# Patient Record
Sex: FEMALE | Employment: UNEMPLOYED | URBAN - METROPOLITAN AREA
[De-identification: names, ages, dates, MRNs, and addresses within clinical notes are randomized per-mention and may not be internally consistent; named-entity substitution may affect disease eponyms.]

---

## 2017-03-11 ENCOUNTER — HOSPITAL ENCOUNTER (EMERGENCY)
Age: 37
Discharge: HOME | End: 2017-03-11
Attending: OBSTETRICS & GYNECOLOGY | Admitting: OBSTETRICS & GYNECOLOGY
Payer: COMMERCIAL

## 2017-03-11 VITALS
SYSTOLIC BLOOD PRESSURE: 120 MMHG | HEIGHT: 63 IN | DIASTOLIC BLOOD PRESSURE: 72 MMHG | TEMPERATURE: 98.1 F | WEIGHT: 150 LBS | RESPIRATION RATE: 20 BRPM | HEART RATE: 67 BPM | BODY MASS INDEX: 26.58 KG/M2

## 2017-03-11 PROCEDURE — 99282 EMERGENCY DEPT VISIT SF MDM: CPT

## 2017-03-11 PROCEDURE — 59025 FETAL NON-STRESS TEST: CPT

## 2017-03-11 PROCEDURE — 76815 OB US LIMITED FETUS(S): CPT

## 2017-03-11 RX ORDER — CALC/MAG/B COMPLEX/D3/HERB 61
15 TABLET ORAL
COMMUNITY

## 2017-03-11 NOTE — IP AVS SNAPSHOT
[address] 
[phone] Patient: Milka Toth MRN: [id number] :1980 You are allergic to the following Allergen Reactions Ceclor (Cefaclor) Hives Recent Documentation Height Weight BMI OB Status Smoking Status 1.6 m 68 kg 26.57 kg/m2 Pregnant Never Smoker Emergency Contacts Name Discharge Info Relation Home Work Mobile SellnerMarkos  Spouse [3] [phone] About your hospitalization You were admitted on:  N/A You last received care in the:  [facility] You were discharged on:  2017 Unit phone number:  [phone] Why you were hospitalized Your primary diagnosis was:  Not on File Providers Seen During Your Hospitalizations Provider Role Specialty Primary office phone Manuel Jackson MD Attending Provider Obstetrics & Gynecology [phone] Your Primary Care Physician (PCP) Primary Care Physician Office Phone Office Fax NONE ** None ** ** None ** Follow-up Information None Current Discharge Medication List  
  
ASK your doctor about these medications Dose & Instructions Dispensing Information Comments Morning Noon Evening Bedtime FISH -160-1,000 mg Cap Generic drug:  omega 3-dha-epa-fish oil Your next dose is: Today, Tomorrow Other:  _________ Take  by mouth. Refills:  0 PRENATAL DHA+COMPLETE PRENATAL -300 mg-mcg-mg Cmpk Generic drug:  PNV no.24-iron-folic acid-dha Your next dose is: Today, Tomorrow Other:  _________ Take  by mouth. Refills:  0 Discharge Instructions Pregnancy Precautions: Care Instructions Your Care Instructions There is no sure way to prevent labor before your due date ( labor) or to prevent most other pregnancy problems. But there are things you can do to increase your chances of a healthy pregnancy. Go to your appointments, follow your doctor's advice, and take good care of yourself. Eat well, and exercise (if your doctor agrees). And make sure to drink plenty of water. Follow-up care is a key part of your treatment and safety. Be sure to make and go to all appointments, and call your doctor if you are having problems. It's also a good idea to know your test results and keep a list of the medicines you take. How can you care for yourself at home? · Make sure you go to your prenatal appointments. At each visit, your doctor will check your blood pressure. Your doctor will also check to see if you have protein in your urine. High blood pressure and protein in urine are signs of preeclampsia. This condition can be dangerous for you and your baby. · Drink plenty of fluids, enough so that your urine is light yellow or clear like water. Dehydration can cause contractions. If you have kidney, heart, or liver disease and have to limit fluids, talk with your doctor before you increase the amount of fluids you drink. · Tell your doctor right away if you notice any symptoms of an infection, such as: ¨ Burning when you urinate. ¨ A foul-smelling discharge from your vagina. ¨ Vaginal itching. ¨ Unexplained fever. ¨ Unusual pain or soreness in your uterus or lower belly. · Eat a balanced diet. Include plenty of foods that are high in calcium and iron. ¨ Foods high in calcium include milk, cheese, yogurt, almonds, and broccoli. ¨ Foods high in iron include red meat, shellfish, poultry, eggs, beans, raisins, whole-grain bread, and leafy green vegetables. · Do not smoke. If you need help quitting, talk to your doctor about stop-smoking programs and medicines. These can increase your chances of quitting for good. · Do not drink alcohol or use illegal drugs. · Follow your doctor's directions about activity. Your doctor will let you know how much, if any, exercise you can do. · Ask your doctor if you can have sex. If you are at risk for early labor, your doctor may ask you to not have sex. · Take care to prevent falls. During pregnancy, your joints are loose, and your balance is off. Sports such as bicycling, skiing, or in-line skating can increase your risk of falling. And don't ride horses or motorcycles, dive, water ski, scuba dive, or parachute jump while you are pregnant. · Avoid getting very hot. Do not use saunas or hot tubs. Avoid staying out in the sun in hot weather for long periods. Take acetaminophen (Tylenol) to lower a high fever. · Do not take any over-the-counter or herbal medicines or supplements without talking to your doctor or pharmacist first. 
When should you call for help? Call 911 anytime you think you may need emergency care. For example, call if: 
· You passed out (lost consciousness). · You have severe vaginal bleeding. · You have severe pain in your belly or pelvis. · You have had fluid gushing or leaking from your vagina and you know or think the umbilical cord is bulging into your vagina. If this happens, immediately get down on your knees so your rear end (buttocks) is higher than your head. This will decrease the pressure on the cord until help arrives. Call your doctor now or seek immediate medical care if: 
· You have signs of preeclampsia, such as: 
¨ Sudden swelling of your face, hands, or feet. ¨ New vision problems (such as dimness or blurring). ¨ A severe headache. · You have any vaginal bleeding. · You have belly pain or cramping. · You have a fever. · You have had regular contractions (with or without pain) for an hour. This means that you have 8 or more within 1 hour or 4 or more in 20 minutes after you change your position and drink fluids. · You have a sudden release of fluid from your vagina. · You have low back pain or pelvic pressure that does not go away. · You notice that your baby has stopped moving or is moving much less than normal. 
Watch closely for changes in your health, and be sure to contact your doctor if you have any problems. Where can you learn more? Go to http://alanna-sally.info/. Enter [id number] in the search box to learn more about \"Pregnancy Precautions: Care Instructions. \" Current as of: May 30, 2016 Content Version: 11.1 © 3485-2483 [employer]. Care instructions adapted under license by [employer] (which disclaims liability or warranty for this information). If you have questions about a medical condition or this instruction, always ask your healthcare professional. [address] any warranty or liability for your use of this information. Discharge Orders None Introducing [facility]! Dear Isabel Spear: Thank you for requesting a Footnote account. Our records indicate that you have previously registered for a Footnote account but its currently inactive. Please call our Footnote support line at [phone]. Additional Information If you have questions, please visit the Frequently Asked Questions section of the Footnote website at [email or web address] Remember, Footnote is NOT to be used for urgent needs. For medical emergencies, dial 911. Now available from your iPhone and Android! General Information Please provide this summary of care documentation to your next provider. Patient Signature:  ____________________________________________________________ Date:  ____________________________________________________________  
  
Diaz Kraus Provider Signature:  ____________________________________________________________ Date:  ____________________________________________________________

## 2017-03-11 NOTE — PROGRESS NOTES
Pt presents to [facility] with C/O decreased fetal movement. Though she has felt some movements since drinking juice. Dr Nigel Purvis at bedside doing a ultrasound.

## 2017-03-11 NOTE — IP AVS SNAPSHOT
Current Discharge Medication List  
  
ASK your doctor about these medications Dose & Instructions Dispensing Information Comments Morning Noon Evening Bedtime FISH -160-1,000 mg Cap Generic drug:  omega 3-dha-epa-fish oil Your next dose is: Today, Tomorrow Other:  ____________ Take  by mouth. Refills:  0 PRENATAL DHA+COMPLETE PRENATAL -300 mg-mcg-mg Cmpk Generic drug:  PNV no.24-iron-folic acid-dha Your next dose is: Today, Tomorrow Other:  ____________ Take  by mouth. Refills:  0

## 2017-03-11 NOTE — DISCHARGE INSTRUCTIONS
Pregnancy Precautions: Care Instructions  Your Care Instructions  There is no sure way to prevent labor before your due date ( labor) or to prevent most other pregnancy problems. But there are things you can do to increase your chances of a healthy pregnancy. Go to your appointments, follow your doctor's advice, and take good care of yourself. Eat well, and exercise (if your doctor agrees). And make sure to drink plenty of water. Follow-up care is a key part of your treatment and safety. Be sure to make and go to all appointments, and call your doctor if you are having problems. It's also a good idea to know your test results and keep a list of the medicines you take. How can you care for yourself at home? · Make sure you go to your prenatal appointments. At each visit, your doctor will check your blood pressure. Your doctor will also check to see if you have protein in your urine. High blood pressure and protein in urine are signs of preeclampsia. This condition can be dangerous for you and your baby. · Drink plenty of fluids, enough so that your urine is light yellow or clear like water. Dehydration can cause contractions. If you have kidney, heart, or liver disease and have to limit fluids, talk with your doctor before you increase the amount of fluids you drink. · Tell your doctor right away if you notice any symptoms of an infection, such as:  ¨ Burning when you urinate. ¨ A foul-smelling discharge from your vagina. ¨ Vaginal itching. ¨ Unexplained fever. ¨ Unusual pain or soreness in your uterus or lower belly. · Eat a balanced diet. Include plenty of foods that are high in calcium and iron. ¨ Foods high in calcium include milk, cheese, yogurt, almonds, and broccoli. ¨ Foods high in iron include red meat, shellfish, poultry, eggs, beans, raisins, whole-grain bread, and leafy green vegetables. · Do not smoke.  If you need help quitting, talk to your doctor about stop-smoking programs and medicines. These can increase your chances of quitting for good. · Do not drink alcohol or use illegal drugs. · Follow your doctor's directions about activity. Your doctor will let you know how much, if any, exercise you can do. · Ask your doctor if you can have sex. If you are at risk for early labor, your doctor may ask you to not have sex. · Take care to prevent falls. During pregnancy, your joints are loose, and your balance is off. Sports such as bicycling, skiing, or in-line skating can increase your risk of falling. And don't ride horses or motorcycles, dive, water ski, scuba dive, or parachute jump while you are pregnant. · Avoid getting very hot. Do not use saunas or hot tubs. Avoid staying out in the sun in hot weather for long periods. Take acetaminophen (Tylenol) to lower a high fever. · Do not take any over-the-counter or herbal medicines or supplements without talking to your doctor or pharmacist first.  When should you call for help? Call 911 anytime you think you may need emergency care. For example, call if:  · You passed out (lost consciousness). · You have severe vaginal bleeding. · You have severe pain in your belly or pelvis. · You have had fluid gushing or leaking from your vagina and you know or think the umbilical cord is bulging into your vagina. If this happens, immediately get down on your knees so your rear end (buttocks) is higher than your head. This will decrease the pressure on the cord until help arrives. Call your doctor now or seek immediate medical care if:  · You have signs of preeclampsia, such as:  ¨ Sudden swelling of your face, hands, or feet. ¨ New vision problems (such as dimness or blurring). ¨ A severe headache. · You have any vaginal bleeding. · You have belly pain or cramping. · You have a fever. · You have had regular contractions (with or without pain) for an hour.  This means that you have 8 or more within 1 hour or 4 or more in 20 minutes after you change your position and drink fluids. · You have a sudden release of fluid from your vagina. · You have low back pain or pelvic pressure that does not go away. · You notice that your baby has stopped moving or is moving much less than normal.  Watch closely for changes in your health, and be sure to contact your doctor if you have any problems. Where can you learn more? Go to http://alanna-sally.info/. Enter [id number] in the search box to learn more about \"Pregnancy Precautions: Care Instructions. \"  Current as of: May 30, 2016  Content Version: 11.1  © 6756-8668 [employer]. Care instructions adapted under license by [employer] (which disclaims liability or warranty for this information). If you have questions about a medical condition or this instruction, always ask your healthcare professional. [address] any warranty or liability for your use of this information.

## 2017-03-11 NOTE — PROGRESS NOTES
History & Physical    Name: Phong Mayfield MRN: [id number]  SSN: [id number]    YOB: 1980  Age: 29 y.o. Sex: female      Subjective:     Reason for Admission:  Pregnancy and decreased fetal movement    History of Present Illness: Ms. Ranee Shone is a 29 y.o.   female with an estimated gestational age of 35w11d with Estimated Date of Delivery: 17. Patient complains of decreased fetal movement this am for approx 3 hours. Started to feel movement after drinking juice and  pushing on belly. Increased movement now. No bleeding or leaking. Pregnancy c/b 39 week demise last pregnancy, Rh neg, h/o 4th degree laceration with first pregnancy. OB History    Para Term  AB SAB TAB Ectopic Multiple Living   4 3 3 0 0 0 0 0 0 2      # Outcome Date GA Lbr Gregor/2nd Weight Sex Delivery Anes PTL Lv   4 Current            3 Term 2015 39w0d   F Vag-Spont   FD      Birth Comments: cord death of infant   2 Term 13 38w4d  6 lb 7 oz (2.92 kg) M VAGINAL DELI EPI N Y   1 Term 11 38w2d  6 lb 1 oz (2.75 kg) F VAGINAL DELI None N Y        Past Medical History:   Diagnosis Date    HX OTHER MEDICAL     IUFD (intrauterine fetal death) 6/10/2015    Postpartum depression     PP depression after 1st, not treated    Rh negative status during pregnancy 2016    Supervision of high-risk pregnancy 2016    Trauma     left foot broken     Past Surgical History:   Procedure Laterality Date    HX COLONOSCOPY       Social History     Occupational History    Not on file.      Social History Main Topics    Smoking status: Never Smoker    Smokeless tobacco: Never Used    Alcohol use No    Drug use: No    Sexual activity: Yes     Partners: Male     Birth control/ protection: None      Family History   Problem Relation Age of Onset    Asthma Mother     Thyroid Disease Mother    [facility] Arthritis-osteo Mother    [place] Bladder Disease Mother     Depression Mother     Diabetes Father     Hypertension Father     Elevated Lipids Father     Heart Disease Father     Alcohol abuse Father     Heart Attack Brother     Alcohol abuse Brother    Kelsi Halon Syndrome Other      Spouse's Uncle    Thyroid Disease Sister     Depression Sister     Hypertension Maternal Grandmother     Heart Disease Maternal Grandfather     Lung Disease Maternal Grandfather     Lung Disease Paternal Grandmother        Allergies   Allergen Reactions    Ceclor [Cefaclor] Hives     Prior to Admission medications    Medication Sig Start Date End Date Taking? Authorizing Provider   PNV no.24-iron-folic acid-dha (PRENATAL DHA+COMPLETE PRENATAL) [id number] mg-mcg-mg cmpk Take  by mouth. Historical Provider        Review of Systems:  A comprehensive review of systems was negative except for that written in the History of Present Illness. Objective:     Vitals: There were no vitals filed for this visit. No data recorded. No Data Recorded     Physical Exam:  Patient without distress. Abdomen: soft, nontender  Fundus: soft and non tender  Lower Extremities:  - Edema No   - No evidence of DVT seen on physical exam.     Membranes:  Intact  Uterine Activity:  None  Fetal Heart Rate:  Reactive  Baseline: 135 per minute  Variability: moderate  Accelerations: yes  Decelerations: none       Lab/Data Review:  No results found for this or any previous visit (from the past 24 hour(s)). Bedside ultrasound:   [place] fetus in cephalic presentation   bpm  LOLITA: 10.22  BPP 8/8  Fetal biometry:    BPD 7.66 cm c/w 30 5/7   HC 29.61 cm c/w 32 5/7   AC 28.30 cm c/w 32 2/7   FL 6.10 cm c/w 31 5/   AUA 31 6/7 with weight of 4 lb 3 oz    Assessment and Plan: Active Problems:    * No active hospital problems.  *     Decreased FM with h/o fetal demise last pregnancy  - improved movement here  - BPP 10/10 (reactive NST), normal fluid  - Has an appt in the office this Friday with growth but informal growth today 4 lb 3 oz   - Continue kick counts and encouraged to call with any concerns    Signed By:  Queen Seven MD     March 11, 2017

## 2017-03-11 NOTE — IP AVS SNAPSHOT
Summary of Care Report The Summary of Care report has been created to help improve care coordination. Users with access to [place] or [facility] (Web-based application) may access additional patient information including the Discharge Summary. If you are not currently a [address] user and need more information, please call the number listed below in the [place] [address] section and ask to be connected with Medical Records. Facility Information Name Address Phone [id number] [place] [address] [address] [place] [id number] [phone] Patient Information Patient Name Sex DONNY Griffin ([id number]) Female 1980 Discharge Information Admitting Provider Service Area Unit Kai Felton MD / Sawyer Romero 4 Antepartum / [phone] Discharge Provider Discharge Date/Time Discharge Disposition Destination (none) (none) (none) (none) Patient Language Language ENGLISH [13] Problem List as of 3/11/2017  Date Reviewed: 2017 Codes Priority Class Noted - Resolved Family history of Down syndrome ICD-10-CM: Z82.79 ICD-9-CM: V19.5   2014 - Present Family history of hemophilia ICD-10-CM: Z83.2 ICD-9-CM: V18.3   2014 - Present Prior poor obstetrical history (3rd trimester fetal demise) ICD-10-CM: O09.292 ICD-9-CM: V23.49   6/10/2015 - Present Overview Addendum 2016  2:44 PM by Nadine Rodrigues MD  
  Genetically normal; felt to be cord accident due to tight nuchal 
 
Would initiate 2x/week testing by 34 weeks. Would deliver at 39th weeks, sooner if concerns. Elderly multigravida in first trimester ICD-10-CM: O09.521 ICD-9-CM: 659.63   10/6/2016 - Present  Overview Addendum 2016  2:26 PM by Geoff De Leon RN  
 NIPT low risk - level 2 anatomy and echo: WNL Repeat at next visit-orders placed. Supervision of high-risk pregnancy ICD-10-CM: O09.90 ICD-9-CM: V23.9   2016 - Present Rh negative status during pregnancy ICD-10-CM: O09.899 ICD-9-CM: V23.89   2016 - Present Overview Addendum 2016  1:41 PM by Shaggy Castrejon MD  
  Antibody screen positive.  (titer too weak) - repeat 4 weeks Negative ABS on 2016 You are allergic to the following Allergen Reactions Ceclor (Cefaclor) Hives Current Discharge Medication List  
  
ASK your doctor about these medications Dose & Instructions Dispensing Information Comments FISH -160-1,000 mg Cap Generic drug:  omega 3-dha-epa-fish oil Take  by mouth. Refills:  0 PRENATAL DHA+COMPLETE PRENATAL -300 mg-mcg-mg Cmpk Generic drug:  PNV no.24-iron-folic acid-dha Take  by mouth. Refills:  0 Current Immunizations Name Date [address] INJECTION 3/16/2011 Rho(D) Immune Globulin - IM 6/10/2015, 2013 Tdap 2013 Follow-up Information None Discharge Instructions Pregnancy Precautions: Care Instructions Your Care Instructions There is no sure way to prevent labor before your due date ( labor) or to prevent most other pregnancy problems. But there are things you can do to increase your chances of a healthy pregnancy. Go to your appointments, follow your doctor's advice, and take good care of yourself. Eat well, and exercise (if your doctor agrees). And make sure to drink plenty of water. Follow-up care is a key part of your treatment and safety. Be sure to make and go to all appointments, and call your doctor if you are having problems. It's also a good idea to know your test results and keep a list of the medicines you take. How can you care for yourself at home? · Make sure you go to your prenatal appointments. At each visit, your doctor will check your blood pressure. Your doctor will also check to see if you have protein in your urine. High blood pressure and protein in urine are signs of preeclampsia. This condition can be dangerous for you and your baby. · Drink plenty of fluids, enough so that your urine is light yellow or clear like water. Dehydration can cause contractions. If you have kidney, heart, or liver disease and have to limit fluids, talk with your doctor before you increase the amount of fluids you drink. · Tell your doctor right away if you notice any symptoms of an infection, such as: ¨ Burning when you urinate. ¨ A foul-smelling discharge from your vagina. ¨ Vaginal itching. ¨ Unexplained fever. ¨ Unusual pain or soreness in your uterus or lower belly. · Eat a balanced diet. Include plenty of foods that are high in calcium and iron. ¨ Foods high in calcium include milk, cheese, yogurt, almonds, and broccoli. ¨ Foods high in iron include red meat, shellfish, poultry, eggs, beans, raisins, whole-grain bread, and leafy green vegetables. · Do not smoke. If you need help quitting, talk to your doctor about stop-smoking programs and medicines. These can increase your chances of quitting for good. · Do not drink alcohol or use illegal drugs. · Follow your doctor's directions about activity. Your doctor will let you know how much, if any, exercise you can do. · Ask your doctor if you can have sex. If you are at risk for early labor, your doctor may ask you to not have sex. · Take care to prevent falls. During pregnancy, your joints are loose, and your balance is off. Sports such as bicycling, skiing, or in-line skating can increase your risk of falling. And don't ride horses or motorcycles, dive, water ski, scuba dive, or parachute jump while you are pregnant. · Avoid getting very hot. Do not use saunas or hot tubs.  Avoid staying out in the sun in hot weather for long periods. Take acetaminophen (Tylenol) to lower a high fever. · Do not take any over-the-counter or herbal medicines or supplements without talking to your doctor or pharmacist first. 
When should you call for help? Call 911 anytime you think you may need emergency care. For example, call if: 
· You passed out (lost consciousness). · You have severe vaginal bleeding. · You have severe pain in your belly or pelvis. · You have had fluid gushing or leaking from your vagina and you know or think the umbilical cord is bulging into your vagina. If this happens, immediately get down on your knees so your rear end (buttocks) is higher than your head. This will decrease the pressure on the cord until help arrives. Call your doctor now or seek immediate medical care if: 
· You have signs of preeclampsia, such as: 
¨ Sudden swelling of your face, hands, or feet. ¨ New vision problems (such as dimness or blurring). ¨ A severe headache. · You have any vaginal bleeding. · You have belly pain or cramping. · You have a fever. · You have had regular contractions (with or without pain) for an hour. This means that you have 8 or more within 1 hour or 4 or more in 20 minutes after you change your position and drink fluids. · You have a sudden release of fluid from your vagina. · You have low back pain or pelvic pressure that does not go away. · You notice that your baby has stopped moving or is moving much less than normal. 
Watch closely for changes in your health, and be sure to contact your doctor if you have any problems. Where can you learn more? Go to http://alanna-sally.info/. Enter [id number] in the search box to learn more about \"Pregnancy Precautions: Care Instructions. \" Current as of: May 30, 2016 Content Version: 11.1 © 5650-3776 [employer].  Care instructions adapted under license by [address] (which disclaims liability or warranty for this information). If you have questions about a medical condition or this instruction, always ask your healthcare professional. [place] [address] any warranty or liability for your use of this information. Chart Review Routing History No Routing History on File

## 2017-04-12 LAB — GRBS, EXTERNAL: NEGATIVE

## 2017-04-24 ENCOUNTER — ANESTHESIA (OUTPATIENT)
Dept: LABOR AND DELIVERY | Age: 37
End: 2017-04-24
Payer: COMMERCIAL

## 2017-04-24 ENCOUNTER — HOSPITAL ENCOUNTER (INPATIENT)
Age: 37
LOS: 1 days | Discharge: HOME | End: 2017-04-25
Attending: OBSTETRICS & GYNECOLOGY | Admitting: OBSTETRICS & GYNECOLOGY
Payer: COMMERCIAL

## 2017-04-24 ENCOUNTER — ANESTHESIA EVENT (OUTPATIENT)
Dept: LABOR AND DELIVERY | Age: 37
End: 2017-04-24
Payer: COMMERCIAL

## 2017-04-24 PROBLEM — Z37.9 NORMAL LABOR: Status: ACTIVE | Noted: 2017-04-24

## 2017-04-24 PROBLEM — O09.299 PRIOR PREGNANCY WITH FETAL DEMISE AND CURRENT PREGNANCY: Status: ACTIVE | Noted: 2017-04-24

## 2017-04-24 LAB
ABO + RH BLD: NORMAL
BASE DEFICIT BLDCOA-SCNC: 2.5 MMOL/L (ref 0–2)
BASE DEFICIT BLDCOV-SCNC: 2.1 MMOL/L (ref 1.9–7.7)
BDY SITE: ABNORMAL
BDY SITE: NORMAL
BLOOD BANK CMNT PATIENT-IMP: NORMAL
BLOOD GROUP ANTIBODIES SERPL: NORMAL
ERYTHROCYTE [DISTWIDTH] IN BLOOD BY AUTOMATED COUNT: 14 % (ref 11.9–14.6)
FETAL SCREEN,FMHS: NORMAL
HCO3 BLDCOA-SCNC: 24 MMOL/L (ref 22–26)
HCO3 BLDV-SCNC: 22 MMOL/L
HCT VFR BLD AUTO: 37 % (ref 35.8–46.3)
HGB BLD-MCNC: 12.3 G/DL (ref 11.7–15.4)
MCH RBC QN AUTO: 29.1 PG (ref 26.1–32.9)
MCHC RBC AUTO-ENTMCNC: 33.2 G/DL (ref 31.4–35)
MCV RBC AUTO: 87.5 FL (ref 79.6–97.8)
PCO2 BLDCOA: 50 MMHG (ref 33–49)
PCO2 BLDCOV: 37 MMHG (ref 14.1–43.3)
PH BLDCOA: 7.3 [PH] (ref 7.21–7.31)
PH BLDCOV: 7.4 [PH] (ref 7.2–7.44)
PLATELET # BLD AUTO: 141 K/UL (ref 150–450)
PMV BLD AUTO: 10.7 FL (ref 10.8–14.1)
PO2 BLDCOA: 24 MMHG (ref 9–19)
PO2 BLDV: 31 MMHG (ref 30.4–57.2)
RBC # BLD AUTO: 4.23 M/UL (ref 4.05–5.25)
SERVICE CMNT-IMP: ABNORMAL
SERVICE CMNT-IMP: NORMAL
SPECIMEN EXP DATE BLD: NORMAL
WBC # BLD AUTO: 9 K/UL (ref 4.3–11.1)

## 2017-04-24 PROCEDURE — 85027 COMPLETE CBC AUTOMATED: CPT | Performed by: OBSTETRICS & GYNECOLOGY

## 2017-04-24 PROCEDURE — 75410000003 HC RECOV DEL/VAG/CSECN EA 0.5 HR

## 2017-04-24 PROCEDURE — 77030014125 HC TY EPDRL BBMI -B: Performed by: ANESTHESIOLOGY

## 2017-04-24 PROCEDURE — 74011250636 HC RX REV CODE- 250/636

## 2017-04-24 PROCEDURE — 3E033VJ INTRODUCTION OF OTHER HORMONE INTO PERIPHERAL VEIN, PERCUTANEOUS APPROACH: ICD-10-PCS | Performed by: OBSTETRICS & GYNECOLOGY

## 2017-04-24 PROCEDURE — 75410000002 HC LABOR FEE PER 1 HR

## 2017-04-24 PROCEDURE — 74011258636 HC RX REV CODE- 258/636: Performed by: OBSTETRICS & GYNECOLOGY

## 2017-04-24 PROCEDURE — 76060000078 HC EPIDURAL ANESTHESIA

## 2017-04-24 PROCEDURE — 77030005518 HC CATH URETH FOL 2W BARD -B

## 2017-04-24 PROCEDURE — A4300 CATH IMPL VASC ACCESS PORTAL: HCPCS | Performed by: ANESTHESIOLOGY

## 2017-04-24 PROCEDURE — 74011250637 HC RX REV CODE- 250/637: Performed by: OBSTETRICS & GYNECOLOGY

## 2017-04-24 PROCEDURE — 65270000029 HC RM PRIVATE

## 2017-04-24 PROCEDURE — 4A1HXCZ MONITORING OF PRODUCTS OF CONCEPTION, CARDIAC RATE, EXTERNAL APPROACH: ICD-10-PCS | Performed by: OBSTETRICS & GYNECOLOGY

## 2017-04-24 PROCEDURE — 86900 BLOOD TYPING SEROLOGIC ABO: CPT | Performed by: OBSTETRICS & GYNECOLOGY

## 2017-04-24 PROCEDURE — 82803 BLOOD GASES ANY COMBINATION: CPT

## 2017-04-24 PROCEDURE — 74011250636 HC RX REV CODE- 250/636: Performed by: OBSTETRICS & GYNECOLOGY

## 2017-04-24 PROCEDURE — 85461 HEMOGLOBIN FETAL: CPT | Performed by: OBSTETRICS & GYNECOLOGY

## 2017-04-24 PROCEDURE — 77030018846 HC SOL IRR STRL H20 ICUM -A

## 2017-04-24 PROCEDURE — 75410000000 HC DELIVERY VAGINAL/SINGLE

## 2017-04-24 RX ORDER — OXYTOCIN/RINGER'S LACTATE 15/250 ML
250 PLASTIC BAG, INJECTION (ML) INTRAVENOUS ONCE
Status: DISCONTINUED | OUTPATIENT
Start: 2017-04-24 | End: 2017-04-24

## 2017-04-24 RX ORDER — NALOXONE HYDROCHLORIDE 0.4 MG/ML
0.4 INJECTION, SOLUTION INTRAMUSCULAR; INTRAVENOUS; SUBCUTANEOUS AS NEEDED
Status: DISCONTINUED | OUTPATIENT
Start: 2017-04-24 | End: 2017-04-25 | Stop reason: HOSPADM

## 2017-04-24 RX ORDER — BUTORPHANOL TARTRATE 1 MG/ML
1 INJECTION INTRAMUSCULAR; INTRAVENOUS
Status: DISCONTINUED | OUTPATIENT
Start: 2017-04-24 | End: 2017-04-24 | Stop reason: HOSPADM

## 2017-04-24 RX ORDER — HYDROMORPHONE HYDROCHLORIDE 1 MG/ML
1 INJECTION, SOLUTION INTRAMUSCULAR; INTRAVENOUS; SUBCUTANEOUS
Status: DISCONTINUED | OUTPATIENT
Start: 2017-04-24 | End: 2017-04-25 | Stop reason: HOSPADM

## 2017-04-24 RX ORDER — FENTANYL CITRATE 50 UG/ML
INJECTION, SOLUTION INTRAMUSCULAR; INTRAVENOUS
Status: DISCONTINUED
Start: 2017-04-24 | End: 2017-04-24

## 2017-04-24 RX ORDER — FENTANYL CITRATE 50 UG/ML
INJECTION, SOLUTION INTRAMUSCULAR; INTRAVENOUS AS NEEDED
Status: DISCONTINUED | OUTPATIENT
Start: 2017-04-24 | End: 2017-04-24 | Stop reason: HOSPADM

## 2017-04-24 RX ORDER — DOCUSATE SODIUM 100 MG/1
100 CAPSULE, LIQUID FILLED ORAL 2 TIMES DAILY
Status: DISCONTINUED | OUTPATIENT
Start: 2017-04-24 | End: 2017-04-25 | Stop reason: HOSPADM

## 2017-04-24 RX ORDER — ROPIVACAINE HYDROCHLORIDE 2 MG/ML
INJECTION, SOLUTION EPIDURAL; INFILTRATION; PERINEURAL
Status: DISCONTINUED | OUTPATIENT
Start: 2017-04-24 | End: 2017-04-24 | Stop reason: HOSPADM

## 2017-04-24 RX ORDER — LIDOCAINE HYDROCHLORIDE 10 MG/ML
1 INJECTION INFILTRATION; PERINEURAL
Status: DISCONTINUED | OUTPATIENT
Start: 2017-04-24 | End: 2017-04-24 | Stop reason: HOSPADM

## 2017-04-24 RX ORDER — IBUPROFEN 800 MG/1
800 TABLET ORAL
Status: DISCONTINUED | OUTPATIENT
Start: 2017-04-24 | End: 2017-04-25 | Stop reason: HOSPADM

## 2017-04-24 RX ORDER — SODIUM CHLORIDE 0.9 % (FLUSH) 0.9 %
5-10 SYRINGE (ML) INJECTION EVERY 8 HOURS
Status: DISCONTINUED | OUTPATIENT
Start: 2017-04-24 | End: 2017-04-24

## 2017-04-24 RX ORDER — LIDOCAINE HYDROCHLORIDE 20 MG/ML
JELLY TOPICAL
Status: DISCONTINUED | OUTPATIENT
Start: 2017-04-24 | End: 2017-04-24 | Stop reason: HOSPADM

## 2017-04-24 RX ORDER — HYDROCODONE BITARTRATE AND ACETAMINOPHEN 5; 325 MG/1; MG/1
1-2 TABLET ORAL
Status: DISCONTINUED | OUTPATIENT
Start: 2017-04-24 | End: 2017-04-25 | Stop reason: HOSPADM

## 2017-04-24 RX ORDER — SODIUM CHLORIDE 0.9 % (FLUSH) 0.9 %
5-10 SYRINGE (ML) INJECTION AS NEEDED
Status: DISCONTINUED | OUTPATIENT
Start: 2017-04-24 | End: 2017-04-24

## 2017-04-24 RX ORDER — DIPHENHYDRAMINE HCL 25 MG
25 CAPSULE ORAL
Status: DISCONTINUED | OUTPATIENT
Start: 2017-04-24 | End: 2017-04-25 | Stop reason: HOSPADM

## 2017-04-24 RX ORDER — OXYTOCIN/RINGER'S LACTATE 15/250 ML
250 PLASTIC BAG, INJECTION (ML) INTRAVENOUS ONCE
Status: ACTIVE | OUTPATIENT
Start: 2017-04-24 | End: 2017-04-25

## 2017-04-24 RX ORDER — OXYTOCIN/RINGER'S LACTATE 30/500 ML
.5-2 PLASTIC BAG, INJECTION (ML) INTRAVENOUS
Status: DISCONTINUED | OUTPATIENT
Start: 2017-04-24 | End: 2017-04-24 | Stop reason: HOSPADM

## 2017-04-24 RX ORDER — SIMETHICONE 80 MG
80 TABLET,CHEWABLE ORAL
Status: DISCONTINUED | OUTPATIENT
Start: 2017-04-24 | End: 2017-04-25 | Stop reason: HOSPADM

## 2017-04-24 RX ORDER — DEXTROSE, SODIUM CHLORIDE, SODIUM LACTATE, POTASSIUM CHLORIDE, AND CALCIUM CHLORIDE 5; .6; .31; .03; .02 G/100ML; G/100ML; G/100ML; G/100ML; G/100ML
125 INJECTION, SOLUTION INTRAVENOUS CONTINUOUS
Status: DISCONTINUED | OUTPATIENT
Start: 2017-04-24 | End: 2017-04-24

## 2017-04-24 RX ORDER — ONDANSETRON 4 MG/1
4 TABLET, ORALLY DISINTEGRATING ORAL
Status: DISCONTINUED | OUTPATIENT
Start: 2017-04-24 | End: 2017-04-25 | Stop reason: HOSPADM

## 2017-04-24 RX ORDER — MINERAL OIL
120 OIL (ML) ORAL
Status: DISCONTINUED | OUTPATIENT
Start: 2017-04-24 | End: 2017-04-24 | Stop reason: HOSPADM

## 2017-04-24 RX ADMIN — IBUPROFEN 800 MG: 800 TABLET, FILM COATED ORAL at 19:09

## 2017-04-24 RX ADMIN — SODIUM CHLORIDE, SODIUM LACTATE, POTASSIUM CHLORIDE, CALCIUM CHLORIDE, AND DEXTROSE MONOHYDRATE 125 ML/HR: 600; 310; 30; 20; 5 INJECTION, SOLUTION INTRAVENOUS at 08:00

## 2017-04-24 RX ADMIN — ROPIVACAINE HYDROCHLORIDE 8 ML/HR: 2 INJECTION, SOLUTION EPIDURAL; INFILTRATION; PERINEURAL at 11:31

## 2017-04-24 RX ADMIN — DOCUSATE SODIUM 100 MG: 100 CAPSULE, LIQUID FILLED ORAL at 19:09

## 2017-04-24 RX ADMIN — FENTANYL CITRATE 100 MCG: 50 INJECTION, SOLUTION INTRAMUSCULAR; INTRAVENOUS at 11:27

## 2017-04-24 RX ADMIN — OXYTOCIN 6 MILLI-UNITS/MIN: 10 INJECTION, SOLUTION INTRAMUSCULAR; INTRAVENOUS at 09:15

## 2017-04-24 NOTE — ANESTHESIA PREPROCEDURE EVALUATION
Anesthetic History   No history of anesthetic complications            Review of Systems / Medical History  Patient summary reviewed and pertinent labs reviewed    Pulmonary  Within defined limits                 Neuro/Psych   Within defined limits           Cardiovascular                  Exercise tolerance: >4 METS     GI/Hepatic/Renal     GERD (pregnancy only): well controlled           Endo/Other  Within defined limits           Other Findings              Physical Exam    Airway  Mallampati: II  TM Distance: 4 - 6 cm  Neck ROM: normal range of motion   Mouth opening: Normal     Cardiovascular    Rhythm: regular  Rate: normal         Dental         Pulmonary  Breath sounds clear to auscultation               Abdominal         Other Findings            Anesthetic Plan    ASA: 1  Anesthesia type: epidural            Anesthetic plan and risks discussed with: Patient and Spouse

## 2017-04-24 NOTE — PROGRESS NOTES
Labor Note    S: Feeling more uncomfortable with contractions. Thinks she will be ready for EMILY in 30 min or so. O:   Visit Vitals    /64    Pulse 78    Temp 98.5 °F (36.9 °C)    Ht 5' 3\" (1.6 m)    Wt 159 lb (72.1 kg)    LMP 08/01/2016 (Exact Date)    Breastfeeding Yes    BMI 28.17 kg/m2     Gen- NAD  SVE- deferred    FHT - baseline 130, mod variability , + accels , no decels   [place]-not tracing well, pt moving at bedside    A/P: 29 y.o. [id number] @ 38 0/7  1) Term IUP- Cat 1  2) Labor- on pitocin, s/p AROM. She is considering EMILY soon.    3) GBS neg

## 2017-04-24 NOTE — ROUTINE PROCESS
SBAR IN Report: Mother    Verbal report received from Juaquin Núñez RN on this patient, who is now being transferred from [facility]note 3]) for routine progression of care. The patient is not wearing a green \"Anesthesia-Duramorph\" band. Report consisted of patient's Situation, Background, Assessment and Recommendations (SBAR).  ID bands were compared with the identification form, and verified with the patient and transferring nurse. Information from the SBAR, Kardex, Procedure Summary, Intake/Output, MAR and Accordion and the Cindi Report was reviewed with the transferring nurse; opportunity for questions and clarification provided.

## 2017-04-24 NOTE — PROGRESS NOTES
Gus Worrell at bedside at 1110. CORRIE Reynolds bedside at 2988    Assisted pt to sitting up on bedside at 1112. Timeout completed at 200 with MD, CORRIE and myself at bedside. Test dose given at 1123. Negative reaction. Dose given at 1127. Pt assisted to lying back in left tilt position. See anesthesia record for details. See vital sign flow sheet for BP. Tolerated procedure well.

## 2017-04-24 NOTE — PROGRESS NOTES
Pt admitted to [facility], [address]. Admission assessment completed. Discussed plan of care with patient. Discussed pt's choice of infant feeding method. Feeding options explored, including the importance of exclusive breastfeeding. Pt informed of our breastfeeding support system from from nursing staff and lactation staff during inpatient stay and following discharge. Questions and concerns addressed at this time. Pt confirms breastfeeding is her decision at this time.

## 2017-04-24 NOTE — PROGRESS NOTES
SBAR OUT Report: Mother    Verbal report given to LISA Vuong RN (full name & credentials) on this patient, who is now being transferred to [facility] (unit) for routine progression of care. The patient is not wearing a green \"Anesthesia-Duramorph\" band. Report consisted of patient's Situation, Background, Assessment and Recommendations (SBAR). Terry ID bands were compared with the identification form, and verified with the patient and receiving nurse. Information from the SBAR, Kardex, Procedure Summary, Intake/Output, MAR, Accordion and Recent Results and the [place] Report was reviewed with the receiving nurse; opportunity for questions and clarification provided.

## 2017-04-24 NOTE — IP AVS SNAPSHOT
Juan Hdz 
 
 
 [address] 
[phone] Patient: Karli Jackson MRN: [id number] :1980 You are allergic to the following Allergen Reactions Ceclor (Cefaclor) Hives Immunizations Administered for This Admission Name Date Influenza Vaccine (Quad) PF  Deferred () Tdap  Deferred () Recent Documentation Height Weight Breastfeeding? BMI OB Status Smoking Status 1.6 m 72.1 kg Yes 28.17 kg/m2 Recent pregnancy Never Smoker Emergency Contacts Name Discharge Info Relation Home Work Mobile Markos Huang  Spouse [3] [phone] About your hospitalization You were admitted on:  2017 You last received care in the:  [address] [facility] You were discharged on:  2017 Unit phone number:  [phone] Why you were hospitalized Your primary diagnosis was:  Not on File Your diagnoses also included:  Normal Labor, Prior Pregnancy With Fetal Demise And Current Pregnancy Providers Seen During Your Hospitalizations Provider Role Specialty Primary office phone Fabienne Ponce MD Attending Provider Obstetrics & Gynecology [phone] Abhinav Hampton MD Attending Provider Obstetrics & Gynecology [phone] Your Primary Care Physician (PCP) Primary Care Physician Office Phone Office Fax NONE ** None ** ** None ** Follow-up Information Follow up With Details Comments Contact Info None   None (395) Patient stated that they have no PCP Roman Luna, DO   [address] [facility] [address] [facility] [address] [phone] Abhinav Hampton MD In 6 weeks Call and schedule an appointment to be seen for a 6 week follow up appointment at [facility] [address] [facility] [address] [phone] Current Discharge Medication List  
  
 START taking these medications Dose & Instructions Dispensing Information Comments Morning Noon Evening Bedtime  
 ibuprofen 800 mg tablet Commonly known as:  MOTRIN Your last dose was: Your next dose is:    
   
   
 Dose:  800 mg Take 1 Tab by mouth every eight (8) hours as needed. Quantity:  30 Tab Refills:  0 CONTINUE these medications which have NOT CHANGED Dose & Instructions Dispensing Information Comments Morning Noon Evening Bedtime FISH -160-1,000 mg Cap Generic drug:  omega 3-dha-epa-fish oil Your last dose was: Your next dose is: Take  by mouth. Refills:  0 PRENATAL DHA+COMPLETE PRENATAL -300 mg-mcg-mg Cmpk Generic drug:  PNV no.24-iron-folic acid-dha Your last dose was: Your next dose is: Take  by mouth. Refills:  0 PREVACID 15 mg capsule Generic drug:  lansoprazole Your last dose was: Your next dose is: Take  by mouth Daily (before breakfast). Refills:  0 Where to Get Your Medications Information on where to get these meds will be given to you by the nurse or doctor. ! Ask your nurse or doctor about these medications  
  ibuprofen 800 mg tablet Discharge Instructions Vaginal Childbirth: Care Instructions Your Care Instructions Your body will slowly heal in the next few weeks. It is easy to get too tired and overwhelmed during the first weeks after your baby is born. Changes in your hormones can shift your mood without warning. You may find it hard to meet the extra demands on your energy and time. Take it easy on yourself. Follow-up care is a key part of your treatment and safety.  Be sure to make and go to all appointments, and call your doctor if you are having problems. It's also a good idea to know your test results and keep a list of the medicines you take. How can you care for yourself at home? · Vaginal bleeding and cramps ¨ After delivery, you will have a bloody discharge from the vagina. This will turn pink within a week and then white or yellow after about 10 days. It may last for 2 to 4 weeks or longer, until the uterus has healed. Use pads instead of tampons until you stop bleeding. ¨ Do not worry if you pass some blood clots, as long as they are smaller than a golf ball. If you have a tear or stitches in your vaginal area, change the pad at least every 4 hours to prevent soreness and infection. ¨ You may have cramps for the first few days after childbirth. These are normal and occur as the uterus shrinks to normal size. Take an over-the-counter pain medicine, such as acetaminophen (Tylenol), ibuprofen (Advil, Motrin), or naproxen (Aleve), for cramps. Read and follow all instructions on the label. Do not take aspirin, because it can cause more bleeding. ¨ Do not take two or more pain medicines at the same time unless the doctor told you to. Many pain medicines have acetaminophen, which is Tylenol. Too much acetaminophen (Tylenol) can be harmful. · Stitches ¨ If you have stitches, they will dissolve on their own and do not need to be removed. Follow your doctor's instructions for cleaning the stitched area. ¨ Put ice or a cold pack on your painful area for 10 to 20 minutes at a time, several times a day, for the first few days. Put a thin cloth between the ice and your skin. ¨ Sit in a few inches of warm water (sitz bath) 3 times a day and after bowel movements. The warm water helps with pain and itching. If you do not have a tub, a warm shower might help. · Breast fullness ¨ Your breasts may overfill (engorge) in the first few days after delivery.  To help milk flow and to relieve pain, warm your breasts in the shower or by using warm, moist towels before nursing. ¨ If you are not nursing, do not put warmth on your breasts or touch your breasts. Wear a tight bra or sports bra and use ice until the fullness goes away. This usually takes 2 to 3 days. ¨ Put ice or a cold pack on your breast after nursing to reduce swelling and pain. Put a thin cloth between the ice and your skin. · Activity ¨ Eat a balanced diet. Do not try to lose weight by cutting calories. Keep taking your prenatal vitamins, or take a multivitamin. ¨ Get as much rest as you can. Try to take naps when your baby sleeps during the day. ¨ Get some exercise every day. But do not do any heavy exercise until your doctor says it is okay. ¨ Wait until you are healed (about 4 to 6 weeks) before you have sexual intercourse. Your doctor will tell you when it is okay to have sex. ¨ Talk to your doctor about birth control. You can get pregnant even before your period returns. Also, you can get pregnant while you are breastfeeding. · Mental health ¨ It is normal to have some sadness, anxiety, sleeplessness, and mood swings after you go home. If you feel upset or hopeless for more than a few days or are having trouble doing the things you need to do, talk to your doctor. · Constipation and hemorrhoids ¨ Drink plenty of fluids, enough so that your urine is light yellow or clear like water. If you have kidney, heart, or liver disease and have to limit fluids, talk with your doctor before you increase the amount of fluids you drink. ¨ Eat plenty of fiber each day. Have a bran muffin or bran cereal for breakfast, and try eating a piece of fruit for a mid-afternoon snack. ¨ For painful, itchy hemorrhoids, put ice or a cold pack on the area several times a day for 10 minutes at a time. Follow this by putting a warm compress on the area for another 10 to 20 minutes or by sitting in a shallow, warm bath. When should you call for help? Call 911 anytime you think you may need emergency care. For example, call if: 
· You are thinking of hurting yourself, your baby, or anyone else. · You have sudden, severe pain in your belly. · You passed out (lost consciousness). Call your doctor now or seek immediate medical care if: 
· You have severe vaginal bleeding. · You are soaking through a pad each hour for 2 or more hours. · Your vaginal bleeding seems to be getting heavier or is still bright red 4 days after delivery. · You are dizzy or lightheaded, or you feel like you may faint. · You are vomiting or cannot keep fluids down. · You have a fever. · You have new or more belly pain. · You pass tissue (not just blood). · Your vaginal discharge smells bad. · Your belly feels tender or full and hard. · Your breasts are continuously painful or red. · You feel sad, anxious, or hopeless for more than a few days. Watch closely for changes in your health, and be sure to contact your doctor if you have any problems. Where can you learn more? Go to http://alanna-sally.info/. Enter U053 in the search box to learn more about \"Vaginal Childbirth: Care Instructions. \" Current as of: May 30, 2016 Content Version: 11.2 © 7165-1676 [employer]. Care instructions adapted under license by [employer] (which disclaims liability or warranty for this information). If you have questions about a medical condition or this instruction, always ask your healthcare professional. [place] [address] any warranty or liability for your use of this information. Discharge Orders None [employer] Announcement We are excited to announce that we are making your provider's discharge notes available to you in [employer]. You will see these notes when they are completed and signed by the physician that discharged you from your recent hospital stay.   If you have any questions or concerns about any information you see in [employer], please call the Health Information Department where you were seen or reach out to your Primary Care Provider for more information about your plan of care. Introducing hospitals & HEALTH SERVICES! Dear Ingrid Gonzalez: Thank you for requesting a Veoh account. Our records indicate that you have previously registered for a Veoh account but its currently inactive. Please call our Veoh support line at [phone]. Additional Information If you have questions, please visit the Frequently Asked Questions section of the Veoh website at [email or web address] Remember, Veoh is NOT to be used for urgent needs. For medical emergencies, dial 911. Now available from your iPhone and Android! General Information Please provide this summary of care documentation to your next provider. Patient Signature:  ____________________________________________________________ Date:  ____________________________________________________________  
  
Aleida Mora Provider Signature:  ____________________________________________________________ Date:  ____________________________________________________________

## 2017-04-24 NOTE — H&P
History & Physical    Name: Kulwinder Pak MRN: [id number]  SSN: [id number]    YOB: 1980  Age: 29 y.o. Sex: female      Subjective:     Estimated Date of Delivery: 17  OB History    Para Term  AB SAB TAB Ectopic Multiple Living   4 3 3 0 0 0 0 0 0 2      # Outcome Date GA Lbr Gregor/2nd Weight Sex Delivery Anes PTL Lv   4 Current            3 Term 2015 39w0d   F Vag-Spont   FD      Birth Comments: cord death of infant   2 Term 13 38w4d  6 lb 7 oz (2.92 kg) M VAGINAL DELI EPI N Y   1 Term 11 38w2d  6 lb 1 oz (2.75 kg) F VAGINAL DELI None N Y          Ms. Veronique Vigil is admitted with pregnancy at 38w0d for induction of labor due to h/o term IUFD. Prenatal course was normal.  Please see prenatal records for details. Past Medical History:   Diagnosis Date    HX OTHER MEDICAL     IUFD (intrauterine fetal death) 6/10/2015    Postpartum depression     PP depression after 1st, not treated    Rh negative status during pregnancy 2016    Supervision of high-risk pregnancy 2016    Trauma     left foot broken     Past Surgical History:   Procedure Laterality Date    HX COLONOSCOPY       Social History     Occupational History    Not on file.      Social History Main Topics    Smoking status: Never Smoker    Smokeless tobacco: Never Used    Alcohol use No    Drug use: No    Sexual activity: Yes     Partners: Male     Birth control/ protection: None     Family History   Problem Relation Age of Onset    Asthma Mother     Thyroid Disease Mother    Clydia Punt Arthritis-osteo Mother    Veronda Grade Bladder Disease Mother     Depression Mother     Diabetes Father     Hypertension Father     Elevated Lipids Father     Heart Disease Father     Alcohol abuse Father     Heart Attack Brother     Alcohol abuse Brother    Mica Beavers Syndrome Other      Spouse's Uncle    Thyroid Disease Sister     Depression Sister     Hypertension Maternal Grandmother     Heart Disease Maternal Grandfather     Lung Disease Maternal Grandfather     Lung Disease Paternal Grandmother        Allergies   Allergen Reactions    Ceclor [Cefaclor] Hives     Prior to Admission medications    Medication Sig Start Date End Date Taking? Authorizing Provider   omega 3-dha-epa-fish oil (FISH OIL) 100-160-1,000 mg cap Take  by mouth. Historical Provider   lansoprazole (PREVACID) 15 mg capsule Take  by mouth Daily (before breakfast). Historical Provider   PNV no.24-iron-folic acid-dha (PRENATAL DHA+COMPLETE PRENATAL) -300 mg-mcg-mg cmpk Take  by mouth. Historical Provider        Review of Systems: A comprehensive review of systems was negative except for that written in the History of Present Illness. Objective:     Vitals:  Vitals:    04/24/17 0727 04/24/17 0731 04/24/17 0736 04/24/17 0813   BP:   130/88 121/79   Pulse:   90 75   Temp:  98.7 °F (37.1 °C)     Weight: 159 lb (72.1 kg)      Height: 5' 3\" (1.6 m)           Physical Exam:  Patient without distress. Heart: Regular rate and rhythm, S1S2 present or without murmur or extra heart sounds  Lung: clear to auscultation throughout lung fields, no wheezes, no rales, no rhonchi and normal respiratory effort  Abdomen: soft, nontender  Fundus: soft and non tender  Perineum: blood absent, amniotic fluid absent  Cervical Exam: 2/60/-2  Lower Extremities:  - Edema No   - No evidence of DVT seen on physical exam.  Membranes:  Artificial Rupture of Membranes;  Amniotic Fluid: medium amount of clear fluid  Fetal Heart Rate: Reactive  Baseline: 130 per minute  Variability: moderate  Accelerations: yes  Decelerations: none  Uterine contractions: irregular         Prenatal Labs:   Lab Results   Component Value Date/Time    ABO/Rh(D) B NEGATIVE 06/09/2015 05:32 PM    Rubella, External 1.06-immune 10/06/2016    HBsAg, External neg 10/06/2016    HIV, External NR 10/06/2016    RPR, External NR 10/06/2016    Gonorrhea, External Neg 07/28/2010    Chlamydia, External Neg 07/28/2010    ABO,Rh B NEG 10/02/2014    GrBStrep, External negative 04/12/2017       Impression/Plan:     Active Problems:    Normal labor (4/24/2017)      Prior pregnancy with fetal demise and current pregnancy (4/24/2017)         Plan: Admit for induction of labor. Group B Strep negative. On pitocin, s/p AROM. Considering NCB vs EMILY.      Signed By:  Hector Okeefe MD     April 24, 2017

## 2017-04-24 NOTE — PROGRESS NOTES
Report received from [facility], RN. Patient care assumed-bedside reporting completed.

## 2017-04-24 NOTE — ANESTHESIA PROCEDURE NOTES
Epidural Block    Start time: 4/24/2017 11:17 AM  End time: 4/24/2017 11:22 AM  Performed by: Carol Schafer  Authorized by: Carol Schafer     Pre-Procedure  Indication: at surgeon's request, procedure for pain and labor epidural    Preanesthetic Checklist: patient identified, risks and benefits discussed, anesthesia consent, site marked, patient being monitored, timeout performed and anesthesia consent    Timeout Time: 11:15        Epidural:   Patient position:  Seated  Prep region:  Lumbar  Prep: Chlorhexidine    Location:  L2-3    Needle and Epidural Catheter:   Needle Type:  Tuohy  Needle Gauge:  19 G  Injection Technique:  Loss of resistance using air and loss of resistance using saline  Attempts:  1  Catheter Size:  20 G  Catheter at Skin Depth (cm):  9  Depth in Epidural Space (cm):  5  Events: no blood with aspiration, no cerebrospinal fluid with aspiration, no paresthesia and negative aspiration test    Test Dose:  Lidocaine 1.5% w/ epi and negative    Assessment:   Catheter Secured:  Tegaderm and tape  Insertion:  Uncomplicated  Patient tolerance:  Patient tolerated the procedure well with no immediate complications

## 2017-04-25 VITALS
OXYGEN SATURATION: 99 % | DIASTOLIC BLOOD PRESSURE: 70 MMHG | HEART RATE: 72 BPM | HEIGHT: 63 IN | SYSTOLIC BLOOD PRESSURE: 114 MMHG | TEMPERATURE: 98.1 F | BODY MASS INDEX: 28.17 KG/M2 | WEIGHT: 159 LBS | RESPIRATION RATE: 18 BRPM

## 2017-04-25 PROCEDURE — 74011250637 HC RX REV CODE- 250/637: Performed by: OBSTETRICS & GYNECOLOGY

## 2017-04-25 PROCEDURE — 74011250636 HC RX REV CODE- 250/636: Performed by: OBSTETRICS & GYNECOLOGY

## 2017-04-25 RX ORDER — HYDROCODONE BITARTRATE AND ACETAMINOPHEN 5; 325 MG/1; MG/1
1-2 TABLET ORAL
Qty: 20 TAB | Refills: 0 | Status: SHIPPED | OUTPATIENT
Start: 2017-04-25 | End: 2017-04-25

## 2017-04-25 RX ORDER — IBUPROFEN 800 MG/1
800 TABLET ORAL
Qty: 30 TAB | Refills: 0 | Status: SHIPPED | OUTPATIENT
Start: 2017-04-25 | End: 2018-09-07

## 2017-04-25 RX ADMIN — IBUPROFEN 800 MG: 800 TABLET, FILM COATED ORAL at 10:47

## 2017-04-25 RX ADMIN — DOCUSATE SODIUM 100 MG: 100 CAPSULE, LIQUID FILLED ORAL at 10:47

## 2017-04-25 RX ADMIN — RHO(D) IMMUNE GLOBULIN (HUMAN) 0.3 MG: 1500 SOLUTION INTRAMUSCULAR at 12:28

## 2017-04-25 RX ADMIN — IBUPROFEN 800 MG: 800 TABLET, FILM COATED ORAL at 02:23

## 2017-04-25 NOTE — ANESTHESIA POSTPROCEDURE EVALUATION
Post-Anesthesia Evaluation and Assessment    Patient: Sheldon Davis MRN: [id number]  SSN: [id number]    YOB: 1980  Age: 29 y.o. Sex: female       Cardiovascular Function/Vital Signs  Visit Vitals    /69 (BP 1 Location: Right arm, BP Patient Position: At rest)    Pulse 86    Temp 36.6 °C (97.9 °F)    Resp 17    Ht 5' 3\" (1.6 m)    Wt 72.1 kg (159 lb)    SpO2 99%    Breastfeeding Yes    BMI 28.17 kg/m2       Patient is status post epidural anesthesia for * No procedures listed *. Nausea/Vomiting: None    Postoperative hydration reviewed and adequate. Pain:  Pain Scale 1: Numeric (0 - 10) (04/25/17 0317)  Pain Intensity 1: 1 (04/25/17 0317)   Managed    Neurological Status: At baseline    Mental Status and Level of Consciousness: Arousable    Pulmonary Status:   O2 Device: Room air (04/24/17 1910)   Adequate oxygenation and airway patent    Complications related to anesthesia: None    Post-anesthesia assessment completed.  No concerns    Signed By: Halima Rabago MD     April 25, 2017

## 2017-04-25 NOTE — DISCHARGE SUMMARY
.  Obstetrical Discharge Summary     Name: Liliana Longo MRN: [id number]  SSN: [id number]    YOB: 1980  Age: 29 y.o. Sex: female      Admit Date: 2017    Discharge Date: 2017     Admitting Physician: Almon Denver, MD     Attending Physician:  Almon Denver, MD     * Admission Diagnoses: Normal labor  Prior pregnancy with fetal demise and current pregnancy, third trimester    * Discharge Diagnoses:   Information for the patient's :  Rodolfo Davila [[id number]]   Delivery of a 6 lb 3.1 oz (2.81 kg) female infant via Vaginal, Spontaneous Delivery on 2017 at 2:32 PM  by . Apgars were 9 and 9. Additional Diagnoses:   Hospital Problems as of 2017  Date Reviewed: 2017          Codes Class Noted - Resolved POA    Normal labor ICD-10-CM: O80, Z37.9  ICD-9-CM: 034  2017 - Present Unknown        Prior pregnancy with fetal demise and current pregnancy ICD-10-CM: O09.299  ICD-9-CM: V23.5  2017 - Present Unknown             Lab Results   Component Value Date/Time    ABO/Rh(D) B NEGATIVE 2017 08:20 AM    Rubella, External 1.06-immune 10/06/2016    GrBStrep, External negative 2017    ABO,Rh B NEG 10/02/2014      Immunization History   Administered Date(s) Administered    Rho(D) Immune Globulin - IM 2013, 06/10/2015    Rhogam Injection 2011    Tdap 2013       * Procedures:   * No surgery found *           * Discharge Condition: good    * Hospital Course: Normal hospital course following the delivery. On PPD #1, patient was meeting all milestones including tolerating a regular diet without nausea or vomiting, ambulating and voiding well. Her pain was well controlled with PO meds and she was having normal lochia. Breastfeeding going well. She was stable for discharge. Desires early discharge. Blood pressure 114/70, pulse 72, temperature 98.1 °F (36.7 °C), resp.  rate 18, height 5' 3\" (1.6 m), weight 159 lb (72.1 kg), last menstrual period 08/01/2016, SpO2 99 %, currently breastfeeding. Gen- NAD  Abd- soft, FF@ U-2, NT  Ext- NT, no cords, no edema   [place]- scant lochia        * Disposition: Home    Discharge Medications:   Current Discharge Medication List      START taking these medications    Details   ibuprofen (MOTRIN) 800 mg tablet Take 1 Tab by mouth every eight (8) hours as needed. Qty: 30 Tab, Refills: 0         CONTINUE these medications which have NOT CHANGED    Details   omega 3-dha-epa-fish oil (FISH OIL) 100-160-1,000 mg cap Take  by mouth.      lansoprazole (PREVACID) 15 mg capsule Take  by mouth Daily (before breakfast). PNV no.24-iron-folic acid-dha (PRENATAL DHA+COMPLETE PRENATAL) -300 mg-mcg-mg cmpk Take  by mouth. * Follow-up Care/Patient Instructions:   Activity: Activity as tolerated, No driving for 1-2 weeks, No sex for 6 weeks and No driving while on analgesics  Diet: Regular Diet  Wound Care: Keep wound clean and dry    Follow-up Information     Follow up With Details Comments Contact Info    None   None ([phone]) Patient stated that they have no PCP      23 Lidia Johnson Said, DO   [address] [address] [address] [facility]  [phone]             Signed By:  Ana Bello MD     April 25, 2017

## 2017-04-25 NOTE — PROGRESS NOTES
SW assessment due to history of postpartum depression after patient's first child. Patient reports that she experienced significant amounts of anxiety/irritability for approximately 10 months. Per patient, once she labeled these emotions as postpartum anxiety, she was able to initiate self-care practices and ask her  for assistance. Patient reports that she was anxious during this pregnancy due to fetal demise during her last pregnancy. Patient reports a strong support system of friends/family.  provided education and literature on support available thru [employer] ([employer]). PSI Warmline:  7-359-394-4PPD (3048). WWW. POSTPARTUM. NET    Family was informed of signs/symptoms, forms of intervention (medication, counseling, education), and resources (local coordinators available telephonically, monthly support group in [place], weekly \"chat with expert\" phone sessions). Additionally, patient was provided with [facility] Checklist.\"      Discussed importance of self-care and accepting help when offered. Family was encouraged to contact me with any questions/needs -  contact information provided.       Aniya Diaz, [address] [facility]

## 2017-04-26 NOTE — LACTATION NOTE
Early discharge. Mom and baby are going home today. Continue to offer the breast without restriction. Mom's milk should be fully in over the next few days. Reviewed engorgement precautions. Hand Expression has been demoed and written hand-out reviewed. As milk comes in baby will be more alert at the breast and swallows will be more obvious. Breasts may feel softer once baby has finished nursing. Baby should be back to birth weight by 3weeks of age. And then gain on average 1 oz per day for the next 2-3 months. Reviewed babies should be exclusively breastfeeding for the first 6 months and that breastfeeding should continue after introduction of appropriate complimentary foods after 6 months. Initial output should be at least 1 wet and 1 bowel movement for each day old baby is. By day 5-7 once milk is fully in baby will consistently have 6 or more soaking wet diapers and about 4 bowel movement. Some babies have a bowel movement with every feeding and some have 1-3 large bowel movements each day. Inadequate output may indicate inadequate feedings and should be reported to your Pediatrician. Bowel habits may change as baby gets older. Encouraged follow-up at Pediatrician in 1-2 days, no later than 1 week of age. Call [facility] for any questions as needed or to set up an OP visit. OP phone calls are returned within 24 hours. Breastfeeding Support Group is offered here monthly. Community Breastfeeding Resource List given.

## 2017-04-26 NOTE — LACTATION NOTE
3rd time mom, experienced breastfeeder. Requesting early discharge at 24 hours. Mom reports baby has been latching and doing well at the breast, has been sleepy the past few attempts. Mom eating lunch now but baby starting to get fussy, mom plans to feed again soon. Reviewed feeding expectations for first 24 hours and discussed cluster feeding and 2nd night. Milk will likely come in quickly. Feed on demand and watch output. Offered to assist or observe latch prior to early discharge if needed or desired. Mom confident. Doing well per mom.

## 2017-05-25 NOTE — L&D DELIVERY NOTE
Delivery Note    Obstetrician:  Lanie Thacker MD    Assistant: none    Pre-Delivery Diagnosis: Term pregnancy, Induced labor, Single fetus or Pregnancy complicated by: previous fetal demise at 44 weeks    Post-Delivery Diagnosis: Living  infant(s) or Female    Information for the patient's :  Jorge Hurt Girl Erinn Keene [[id number]]       Labor Events:    Labor: No   Rupture Date: 2017   Rupture Time: 9:04 AM   Rupture Type AROM   Amniotic Fluid Volume: Moderate    Amniotic Fluid Description: Clear     Induction: AROM; Oxytocin       Augmentation: None   Labor Events: None     Cervical Ripening:     None     Delivery Events:  Episiotomy: None   Laceration(s): None     Repaired: None    Number of Repair Packets:     Suture Type and Size: None     Estimated Blood Loss (ml): 150ml       Delivery Date: 2017    Delivery Time: 2:32 PM  Delivery Type: Vaginal, Spontaneous Delivery  Sex:  Female     Gestational Age: 38w0d   Delivery Clinician:  Lanie Thacker  Living Status: Yes   Delivery Location: L&D            APGARS  One minute Five minutes Ten minutes   Skin color: 1   1        Heart rate: 2   2        Grimace: 2   2        Muscle tone: 2   2        Breathin   2        Totals: 9   9            Presentation: Vertex    Position: Right Occiput Anterior  Resuscitation Method:  Tactile Stimulation;Suctioning-bulb     Meconium Stained: None      Cord Vessels: 3 Vessels      Cord Events:    Cord Blood Sent?:  Yes    Blood Gases Sent?:  Yes    Placenta:  Date/Time:   2:36 PM  Removal: Spontaneous      Appearance: Normal      Measurements:  Birth Weight: 6 lb 3.1 oz (2.81 kg)      Birth Length: 49.5 cm      Head Circumference: 33 cm      Chest Circumference: 30.5 cm     Abdominal Girth:       Other Providers:   DARLING Cohen;HAIR MATA;DONALD GALLOWAY, Obstetrician;Primary Nurse;Scrub Tech;Charge Nurse           Group B Strep:   Lab Results   Component Value Date/Time    GrBStrep, External negative 2017     Information for the patient's :  Genella Figures [[id number]]     Lab Results   Component Value Date/Time    ABO/Rh(D) B POSITIVE 2017 02:32 PM    VIKY IgG NEG 2017 02:32 PM     Lab Results   Component Value Date/Time    APH 7.304 2017 02:32 PM    APCO2 50 (H) 2017 02:32 PM    APO2 24 (H) 2017 02:32 PM    AHCO3 24 2017 02:32 PM    ABDC 2.5 (H) 2017 02:32 PM    EPHV 7.399 2017 02:32 PM    PCO2V 37 2017 02:32 PM    PO2V 31 2017 02:32 PM    HCO3V 22 2017 02:32 PM    EBDV 2.1 2017 02:32 PM    SITE CORD 2017 02:32 PM    SITE CORD 2017 02:32 PM    RSCOM NA at 2017 2 48 04 PM. Not read back. 2017 02:32 PM    RSCOM NA at 2017 2 48 30 PM. Not read back. 2017 02:32 PM               Cord Blood Results:   Information for the patient's :  [place] Figures [[id number]]     Lab Results   Component Value Date/Time    VIKY IgG NEG 2017 02:32 PM    ABO/Rh(D) B POSITIVE 2017 02:32 PM     Prenatal Labs:     Lab Results   Component Value Date/Time    ABO/Rh(D) B NEGATIVE 2017 08:20 AM    HBsAg, External neg 10/06/2016    HIV, External NR 10/06/2016    Rubella, External 1.06-immune 10/06/2016    RPR, External NR 10/06/2016    Gonorrhea, External Neg 2010    Chlamydia, External Neg 2010    GrBStrep, External negative 2017        Attending Attestation:   Baby delivered in [facility] presentation. Shoulders and body easily delivered. Mouth and nose bulb suctioned. Cord clamped x 2 and cut, delayed clamping for 1 min after delivery. Infant placed on maternal abdomen. Cord gases and blood obtained. Placenta delivered intact using fundal massage and gentle traction, 3V cord. Perineum inspected and intact. EBL: 150 cc  Fundus firm. Mom and baby stable. I was present and scrubbed for the entire procedure.        Signed By: Hanna Cleaning MD     April 24, 2017 3

## 2018-09-07 NOTE — DISCHARGE INSTRUCTIONS
Post-Operative Instructions  Hip Arthroscopy Post-Operative Worksheet   PAIN  o Most patients require some narcotic medication after surgery. You will be given a prescription(s) with instructions for its use. Do not take more than prescribed. If your pain is not adequately controlled, contact the surgeon on call. Phone numbers are provided. o Common side effects of the narcotics include nausea, vomiting, drowsiness, constipation, and difficulty urinating. If you experience constipation, drink lots of water/Gatorade, avoid soda and diet drinks. Eat plenty of fiber. You may take a stool softener: Colace 100mg twice a day for the first week. For severe constipation use magnesium citrate, one 8 oz bottle. All can be bought at the pharmacy. If you have difficulty urinating, try spending a little time out of bed on the crutches. If it is not possible for you to urinate and you become uncomfortable, it is best if you go to the Emergency Room to get catheterized. o Contact the office if you have nausea and vomiting. This is usually caused by the anesthesia or narcotics. We will either give you a medication for nausea at time of surgery or we will call it in to a pharmacy if you experience these symptoms. o Do not drive or make important business decisions while using narcotics. o Do not take extra Tylenol if the pain medication given to you already has Tylenol in it.  o Typically naprosyn or indomethacin will be prescribed for use after your hip surgery. This may help prevent extra bone from developing after this type of surgery. Some patients may need to take an over the counter proton pump inhibitor for heartburn if they have significant stomach irritation while taking an anti-inflammatory medicine. A pharmacist or your doctor should be able to help you find this.  If severe irritation continues discontinue use of the medicine and contact your doctor.  o Danella Meter may experience low back pain due to muscle spasm from your procedure. If so, apply a heating pad to the area and take your pain medication if you have not done so.  o You may experience numbness or tingling (neurapraxia) in your groin, incision sites, thigh, or foot. It is typically temporary and may resolve in a few days or up to several weeks.  WOUND CARE  o It is common for some staining of the bandage to occur. If this happens, reinforce the area with additional bandages. Please do not use bacitracin or other ointments under the bandages. o Your thigh will be swollen from the arthroscopy fluid for the first couple of days, this is normal and will resolve over time. o Keep dressing dry and clean. You may remove the surgical bandages 4-7 days after surgery unless otherwise informed. Leave the steri-strips (sticky strips over incision) and/or sutures in place and do not remove. Re-dress the incision with a light dressing.  o To avoid problems with infection, keep incision clean and dry. You may shower after post operative day four if the incisions are dry. Do not scrub the incisions. Gently pat the area dry after showering. Do not soak incision (bath, hot tub) until the skin is fully healed. If there is any concern about the incision, please call the on call the office or doctor on call.  o A low grade temperature is very common within the first few days of surgery. This can often be treated with getting out of bed in a sitting or standing position, deep breathing and coughing to clear the lungs. If fevers, pain or swelling continue, please call.  ACTIVITY  o Elevate the operative leg above the level of your heart as much as possible during the first week. This will help with pain and swelling. Elevate leg with a couple of pillows placed under your ankle/foot (to keep the knee from sitting in a flexed or bent position). o Weight bearing instructions and crutch use per physician. Most hip scopes will be TTWB for at least 4 weeks.    o Use the CPM (motion machine) as instructed. I would like you to use the CPM right away, even before physical therapy. o Avoid prolonged sitting or long distance traveling for 2-3 weeks if possible. If not call or ask for instructions. o May return to sedentary work or school in 3-7 days if tolerated.  o See Rehab section     DIET  o Begin with clear fluids and light foods (jello, clear broths). Progress to a regular diet as tolerated.  ICE  o Use ice packs for 30 minutes on, 30 minutes off until swelling has subsided. You may have been provided an ice machine that you may use in a similar fashion or as directed by the instructions for the machine.  EXERCISE  o Physical therapy should start as soon as possible after the day of your surgery. A rehabilitation protocol will be sent to your therapist. Have your therapist contact our office with any questions. o Begin quad sets and heel slides if motion exercises cleared with physician.    o Move ankle up and down throughout the day to help blood flow and decrease the chance of a blood clot. o Do exercises 3-4 times a day until your first post operative visit. Do exercises unless instructed to stay immobilized.  CONCERNS/QUESTIONS  o If you feel unrelenting pain, notice incision redness, continuous drainage or bleeding from wounds, continued fevers greater than 101°, difficulty breathing or excessive nausea/vomiting, please call [phone] during regular office hours or after 4:00 pm or on weekends. o If you have an emergency that requires immediate attention, proceed to the nearest Emergency Room.  FOLLOW UP APPOINTMENTS  o If you do not already have a follow up appointment scheduled, please call [phone] during normal office hours and ask to schedule an appointment approximately 1-2 weeks post op.          IMPORTANT NUMBERS  o Questions  - During Office Hours (8:00-4:00)    (Medical Assistant) [phone]  - After Hours (Tell the hospital  your surgeons name and they will contact the appropriate on call physician)  Jay Jay Olvera [phone]  o Office Appointment Scheduling  - [phone]      Rehab:   ?WB Status : Foot Flat with 20 lb of pressure - TDWB. Duration 4 weeks ( average is 4- 6 weeks)     ? CPM Start 30 - 70 degrees, Increase as tolerated 0 - 90 degrees. DO NOT USE AT NIGHT WHILE SLEEPING. May change to varying degrees of abduction (away from midline) less than 20 degrees to help mobilize tissue. Duration 4 hours broken up. Passive Circumduction of the hip while laying supine, avoid external rotation. Helps prevent adhesions. Sleeping Boots or Ace Wrap feet when sleeping for 2 weeks (average: 2-4 weeks)   *this prevents the foot from rotating to the side and putting stress on the repair. Stationary Bicycle: Immediate Post-op, 1 - 2 times / day x 15 - 20 minutes   *Avoid pinching in front of hip by setting seat high     Pool Exercises: Begin P.O. Day #14 or as soon as sutures are removed and wound is healed. General Considerations:    Typically requires 3 months of supervised therapy    Month 1: Tissue Healing Phase (1 x per week)   o Goals: Pain Control & Decrease tissue inflammation   - Decrease swelling   - Maintenance of motion (flexion 0 - 90\"; IR as tolerated; ER 0 - 30')          Month 2: Early Functional Recovery (2X per week)    - Goals:      -Full PROM with progression to Full AROM    -Early strength gains    -AVOID FLEXOR TENDONITIS AND ABDUCTOR TENDONITIS!!!        Month 3: Late Functional Recovery (3 x per week)    - Goals: Advance strength gains - focus on abductor and hip flexor strength. - Balance and proprioception.   - Continue to monitor for development of tendonitis. - Progress to sport specific activity in months 4 and 5 depending on strength. - Do not progress to running until abductor strength is equal to contralateral side.     - Progression to sport specific activities requires full strength return and muscle coordination. Caution    Avoid anything which causes either anterior or lateral impingement.  Be aware of Low Back of SI Joint Dysfunction.  Pay close attention for the onset of Flexor Tendonitis and Abductor Tendonitis.  Patients with preoperative weakness in proximal hip musculature are at increased risk for postoperative tendonitis.  Modification of activity with focus on decreasing inflammation takes precedent if tendonitis occurs. This is not uncommon within the first 3 months of tx.  Athletes or Work Condition individuals may be progressed faster unless microfracture or osteochondroplasty procedures have been performed. Learning About How to Use Crutches  Your Care Instructions  Crutches can help you walk when you have an injured hip, leg, knee, ankle, or foot. Your doctor will tell you how much weight-if any-you can put on your leg. Be sure your crutches fit you. When you stand up in your normal posture, there should be space for two or three fingers between the top of the crutch and your armpit. When you let your hands hang down, the hand  should be at your wrists. When you put your hands on the hand , your elbows should be slightly bent. To stay safe when using crutches:  · Look straight ahead, not down at your feet. · Clear away small rugs, cords, or anything else that could cause you to trip, slip, or fall. · Be very careful around pets and small children. They can get in your path when you least expect it. · Be sure the rubber tips on your crutches are clean and in good condition to help prevent slipping. · Avoid slick conditions, such as wet floors and snowy or icy driveways. In bad weather, be especially careful on curbs and steps. How to use crutches  Getting ready to walk    1. Bend your elbows slightly. Press the padded top parts of the crutches against your sides, under your armpits.   2. If you have been told not to put any weight on your injured leg, keep that leg bent and off the ground. Walking with crutches    1. Put both crutches about 12 inches in front of you. 2. Put your weight on the handgrips, not on the pads under your arms. (Constant pressure against your underarms can cause numbness.) Swing your body forward. (If you have been told not to put any weight on your injured leg, keep that leg bent and off the ground.)  3. To complete the step, put your weight on the strong leg. 4. Move your crutches about 12 inches in front of you, and start the next step. 5. When you're confident using the crutches, you can move the crutches and your injured leg at the same time. Then push straight down on the crutches as you step past the crutches with your strong leg, as you would in normal walking. 6. Take small steps. 7. Use ramps and elevators when you can. Sitting down    1. To sit, back up to the chair. Use one hand to hold both crutches by the handgrips, beside your injured leg. With the other hand, hold onto the seat and slowly lower yourself onto the chair. 2. Lay the crutches on the ground near your chair. If you prop them up, they may fall over. Getting up from a chair    1. To get up from a chair,  the crutches and put them in one hand beside your injured leg. 2. Put your weight on the handgrips of the crutches and on your strong leg to stand up. Walking up stairs    1. To go up stairs, step up with your strong leg and then bring the crutches and your injured leg to the upper step. 2. For stairs that have handrails: Put both crutches under the arm opposite the handrail. Use the hand opposite the handrail to hold both crutches by the handgrips. 3. Hold onto the handrail as you go up. Put your strong leg on the step first when you go up. Walking down stairs    1. To go down stairs, put your crutches and injured leg on the lower step. 2. Bring your strong leg to the lower step.  This saying may help you remember: \"Up with the good, down with the bad. \"  3. For stairs that have handrails: Put both crutches under the arm opposite the handrail. Use the hand opposite the handrail to hold both crutches by the handgrips. Hold onto the handrail as you go down. Follow the same process you use for stairs: Lead with your crutches and injured leg on the way down. Follow-up care is a key part of your treatment and safety. Be sure to make and go to all appointments, and call your doctor if you are having problems. It's also a good idea to know your test results and keep a list of the medicines you take. Where can you learn more? Go to http://alanna-sally.info/. Enter X838 in the search box to learn more about \"Learning About How to Use Crutches. \"  Current as of: March 21, 2017  Content Version: 11.4  © 6631-1954 [employer]. Care instructions adapted under license by [employer] (which disclaims liability or warranty for this information). If you have questions about a medical condition or this instruction, always ask your healthcare professional. [place] [address] any warranty or liability for your use of this information. TYPICAL SIDE EFFECTS OF PAIN MEDICATION:  *    Constipation: Drink lots of fluids. Over the counter stool softener if needed. *    Nausea: Take pain medication with food. Call your doctor with persistent nausea. ACTIVITY  · As tolerated and as directed by your doctor. · Bathe or shower as directed by your doctor. DIET  · Day of surgery: Clear liquids until no nausea or vomiting; small portion, light diet [place] foods (ex: baked chicken, plain rice, grits, scrambled eggs, toast). Nothing greasy, fried or spicy today. · Advance to regular diet on second day, unless your doctor orders otherwise. · If nausea and vomiting continues, call your doctor. PAIN  · Take pain medication as directed by your doctor.     · DO NOT take aspirin or blood thinners unless directed by your doctor. CALL YOUR DOCTOR IF    s Call your doctor if pain is NOT relieved by medication.   s Excessive bleeding that does not stop after holding pressure over the area  · Temperature of 101 degrees F or above  · Excessive redness, swelling or bruising, and/ or green or yellow, smelly discharge from incision    AFTER ANESTHESIA   · For the first 24 hours: DO NOT Drive, Drink alcoholic beverages, or Make important decisions. · Be aware of dizziness following anesthesia and while taking pain medication. DISCHARGE SUMMARY from Nurse    PATIENT INSTRUCTIONS:    After general anesthesia or intravenous sedation, for 24 hours or while taking prescription Narcotics:  · Limit your activities  · Do not drive and operate hazardous machinery  · Do not make important personal or business decisions  · Do  not drink alcoholic beverages  · If you have not urinated within 8 hours after discharge, please contact your surgeon on call. *  Please give a list of your current medications to your Primary Care Provider. *  Please update this list whenever your medications are discontinued, doses are      changed, or new medications (including over-the-counter products) are added. *  Please carry medication information at all times in case of emergency situations. Preventing Infection at Home  We care about preventing infection and avoiding the spread of germs - not only when you are in the hospital but also when you return home. When you return home from the hospital, its important to take the following steps to help prevent infection and avoid spreading germs that could infect you and others. Ask everyone in your home to follow these guidelines, too. Clean Your Hands  · Clean your hands whenever your hands are visibly dirty, before you eat, before or after touching your mouth, nose or eyes, and before preparing food.  Clean them after contact with body fluids, using the restroom, touching animals or changing diapers. · When washing hands, wet them with warm water and work up a lather. Rub hands for at least 15 seconds, then rinse them and pat them dry with a clean towel or paper towel. · When using hand sanitizers, it should take about 15 seconds to rub your hands dry. If not, you probably didnt apply enough . Cover Your Sneeze or Cough  Germs are released into the air whenever you sneeze or cough. To prevent the spread of infection:  · Turn away from other people before coughing or sneezing. · Cover your mouth or nose with a tissue when you cough or sneeze. Put the tissue in the trash. · If you dont have a tissue, cough or sneeze into your upper sleeve, not your hands. · Always clean your hands after coughing or sneezing. Care for Wounds  Your skin is your bodys first line of defense against germs, but an open wound leaves an easy way for germs to enter your body. To prevent infection:  · Clean your hands before and after changing wound dressings, and wear gloves to change dressings if recommended by your doctor. · Take special care with IV lines or other devices inserted into the body. If you must touch them, clean your hands first.  · Follow any specific instructions from your doctor to care for your wounds. Contact your doctor if you experience any signs of infection, such as fever or increased redness at the surgical or wound site. Keep a Clean Home  · Clean or wipe commonly touched hard surfaces like door handles, sinks, tabletops, phones and TV remotes. · Use products labeled disinfectant to kill harmful bacteria and viruses. · Use a clean cloth or paper towel to clean and dry surfaces. Wiping surfaces with a dirty dishcloth, sponge or towel will only spread germs. · Never share toothbrushes, mclaughlin, drinking glasses, utensils, razor blades, face cloths or bath towels to avoid spreading germs.   · Be sure that the linens that you sleep on are clean.  · Keep pets away from wounds and wash your hands after touching pets, their toys or bedding. We care about you and your health. Remember, preventing infections is a team effort between you, your family, friends and health care providers. These are general instructions for a healthy lifestyle:    No smoking/ No tobacco products/ Avoid exposure to second hand smoke    Surgeon General's Warning:  Quitting smoking now greatly reduces serious risk to your health. Obesity, smoking, and sedentary lifestyle greatly increases your risk for illness    A healthy diet, regular physical exercise & weight monitoring are important for maintaining a healthy lifestyle    You may be retaining fluid if you have a history of heart failure or if you experience any of the following symptoms:  Weight gain of 3 pounds or more overnight or 5 pounds in a week, increased swelling in our hands or feet or shortness of breath while lying flat in bed. Please call your doctor as soon as you notice any of these symptoms; do not wait until your next office visit. Recognize signs and symptoms of STROKE:    F-face looks uneven    A-arms unable to move or move unevenly    S-speech slurred or non-existent    T-time-call 911 as soon as signs and symptoms begin-DO NOT go       Back to bed or wait to see if you get better-TIME IS BRAIN.

## 2018-09-09 ENCOUNTER — ANESTHESIA EVENT (OUTPATIENT)
Dept: SURGERY | Age: 38
End: 2018-09-09
Payer: COMMERCIAL

## 2018-09-10 ENCOUNTER — APPOINTMENT (OUTPATIENT)
Dept: GENERAL RADIOLOGY | Age: 38
End: 2018-09-10
Attending: ORTHOPAEDIC SURGERY
Payer: COMMERCIAL

## 2018-09-10 ENCOUNTER — HOSPITAL ENCOUNTER (OUTPATIENT)
Age: 38
Setting detail: OUTPATIENT SURGERY
Discharge: HOME | End: 2018-09-10
Attending: ORTHOPAEDIC SURGERY | Admitting: ORTHOPAEDIC SURGERY
Payer: COMMERCIAL

## 2018-09-10 ENCOUNTER — ANESTHESIA (OUTPATIENT)
Dept: SURGERY | Age: 38
End: 2018-09-10
Payer: COMMERCIAL

## 2018-09-10 VITALS
HEART RATE: 61 BPM | SYSTOLIC BLOOD PRESSURE: 120 MMHG | OXYGEN SATURATION: 99 % | WEIGHT: 125 LBS | BODY MASS INDEX: 22.14 KG/M2 | DIASTOLIC BLOOD PRESSURE: 80 MMHG | TEMPERATURE: 97.5 F | RESPIRATION RATE: 14 BRPM

## 2018-09-10 PROBLEM — Z98.890 STATUS POST HIP SURGERY: Status: ACTIVE | Noted: 2018-09-10

## 2018-09-10 PROCEDURE — C1713 ANCHOR/SCREW BN/BN,TIS/BN: HCPCS | Performed by: ORTHOPAEDIC SURGERY

## 2018-09-10 PROCEDURE — 77030039425 HC BLD LARYNG TRULITE DISP TELE -A: Performed by: ANESTHESIOLOGY

## 2018-09-10 PROCEDURE — 74011250637 HC RX REV CODE- 250/637: Performed by: ANESTHESIOLOGY

## 2018-09-10 PROCEDURE — 77030034849: Performed by: ORTHOPAEDIC SURGERY

## 2018-09-10 PROCEDURE — 77030018836 HC SOL IRR NACL ICUM -A: Performed by: ORTHOPAEDIC SURGERY

## 2018-09-10 PROCEDURE — 74011250637 HC RX REV CODE- 250/637: Performed by: ORTHOPAEDIC SURGERY

## 2018-09-10 PROCEDURE — 77030036647 HC PORTAL ENTRY ENDOSC KT DISP STRY -D: Performed by: ORTHOPAEDIC SURGERY

## 2018-09-10 PROCEDURE — 77030008467 HC STPLR SKN COVD -B: Performed by: ORTHOPAEDIC SURGERY

## 2018-09-10 PROCEDURE — 77030008703 HC TU ET UNCUF COVD -A: Performed by: ANESTHESIOLOGY

## 2018-09-10 PROCEDURE — 74011250636 HC RX REV CODE- 250/636: Performed by: ORTHOPAEDIC SURGERY

## 2018-09-10 PROCEDURE — 77030019605: Performed by: ORTHOPAEDIC SURGERY

## 2018-09-10 PROCEDURE — 77030034880: Performed by: ORTHOPAEDIC SURGERY

## 2018-09-10 PROCEDURE — 77030031139 HC SUT VCRL2 J&J -A: Performed by: ORTHOPAEDIC SURGERY

## 2018-09-10 PROCEDURE — 74011250636 HC RX REV CODE- 250/636: Performed by: ANESTHESIOLOGY

## 2018-09-10 PROCEDURE — 77030015993 HC INSRT FT PD S&N -B: Performed by: ORTHOPAEDIC SURGERY

## 2018-09-10 PROCEDURE — 76010000132 HC OR TIME 2.5 TO 3 HR: Performed by: ORTHOPAEDIC SURGERY

## 2018-09-10 PROCEDURE — 76210000017 HC OR PH I REC 1.5 TO 2 HR: Performed by: ORTHOPAEDIC SURGERY

## 2018-09-10 PROCEDURE — 77030032673: Performed by: ORTHOPAEDIC SURGERY

## 2018-09-10 PROCEDURE — 77030038020 HC MANFLD NEPTUNE STRY -B: Performed by: ORTHOPAEDIC SURGERY

## 2018-09-10 PROCEDURE — 77030008477 HC STYL SATN SLP COVD -A: Performed by: ANESTHESIOLOGY

## 2018-09-10 PROCEDURE — 74011000250 HC RX REV CODE- 250

## 2018-09-10 PROCEDURE — 76060000036 HC ANESTHESIA 2.5 TO 3 HR: Performed by: ORTHOPAEDIC SURGERY

## 2018-09-10 PROCEDURE — 76210000026 HC REC RM PH II 1 TO 1.5 HR: Performed by: ORTHOPAEDIC SURGERY

## 2018-09-10 PROCEDURE — 77030027385 HC BLD SHV ARTHSCP STRY -B: Performed by: ORTHOPAEDIC SURGERY

## 2018-09-10 PROCEDURE — 77030002916 HC SUT ETHLN J&J -A: Performed by: ORTHOPAEDIC SURGERY

## 2018-09-10 PROCEDURE — 74011250636 HC RX REV CODE- 250/636

## 2018-09-10 PROCEDURE — 77030019940 HC BLNKT HYPOTHRM STRY -B: Performed by: ANESTHESIOLOGY

## 2018-09-10 PROCEDURE — 77030029352 HC BLD FLL RAD SAMURAI STRY -C: Performed by: ORTHOPAEDIC SURGERY

## 2018-09-10 PROCEDURE — 77030034881 HC SUT MNGR SLNGSHOT DISP INST STRY -D: Performed by: ORTHOPAEDIC SURGERY

## 2018-09-10 PROCEDURE — 77030033073 HC TBNG ARTHSC PMP OUTFLO STRY -B: Performed by: ORTHOPAEDIC SURGERY

## 2018-09-10 PROCEDURE — 77030037361: Performed by: ORTHOPAEDIC SURGERY

## 2018-09-10 PROCEDURE — 77030033005 HC TBNG ARTHSC PMP STRY -B: Performed by: ORTHOPAEDIC SURGERY

## 2018-09-10 PROCEDURE — 77030029353 HC SUT PASS ENDOSC NANOPS STRY -C: Performed by: ORTHOPAEDIC SURGERY

## 2018-09-10 DEVICE — NANOTACK SUTURE ANCHOR 1.4MM WITH FLEX INSERTER
Type: IMPLANTABLE DEVICE | Site: HIP | Status: FUNCTIONAL
Brand: NANOTACK

## 2018-09-10 RX ORDER — ONDANSETRON 2 MG/ML
INJECTION INTRAMUSCULAR; INTRAVENOUS AS NEEDED
Status: DISCONTINUED | OUTPATIENT
Start: 2018-09-10 | End: 2018-09-10 | Stop reason: HOSPADM

## 2018-09-10 RX ORDER — FENTANYL CITRATE 50 UG/ML
100 INJECTION, SOLUTION INTRAMUSCULAR; INTRAVENOUS ONCE
Status: DISCONTINUED | OUTPATIENT
Start: 2018-09-10 | End: 2018-09-10 | Stop reason: HOSPADM

## 2018-09-10 RX ORDER — PANTOPRAZOLE SODIUM 40 MG/1
40 TABLET, DELAYED RELEASE ORAL
Status: CANCELLED | OUTPATIENT
Start: 2018-09-10

## 2018-09-10 RX ORDER — SODIUM CHLORIDE, SODIUM LACTATE, POTASSIUM CHLORIDE, CALCIUM CHLORIDE 600; 310; 30; 20 MG/100ML; MG/100ML; MG/100ML; MG/100ML
25 INJECTION, SOLUTION INTRAVENOUS CONTINUOUS
Status: CANCELLED | OUTPATIENT
Start: 2018-09-10

## 2018-09-10 RX ORDER — ROCURONIUM BROMIDE 10 MG/ML
INJECTION, SOLUTION INTRAVENOUS AS NEEDED
Status: DISCONTINUED | OUTPATIENT
Start: 2018-09-10 | End: 2018-09-10 | Stop reason: HOSPADM

## 2018-09-10 RX ORDER — NAPROXEN 250 MG/1
500 TABLET ORAL 2 TIMES DAILY WITH MEALS
Status: CANCELLED | OUTPATIENT
Start: 2018-09-11

## 2018-09-10 RX ORDER — ONDANSETRON 2 MG/ML
4 INJECTION INTRAMUSCULAR; INTRAVENOUS
Status: CANCELLED | OUTPATIENT
Start: 2018-09-10

## 2018-09-10 RX ORDER — EPINEPHRINE 1 MG/ML
INJECTION, SOLUTION, CONCENTRATE INTRAVENOUS AS NEEDED
Status: DISCONTINUED | OUTPATIENT
Start: 2018-09-10 | End: 2018-09-10 | Stop reason: HOSPADM

## 2018-09-10 RX ORDER — HYDROMORPHONE HYDROCHLORIDE 1 MG/ML
0.5 INJECTION, SOLUTION INTRAMUSCULAR; INTRAVENOUS; SUBCUTANEOUS
Status: CANCELLED | OUTPATIENT
Start: 2018-09-10

## 2018-09-10 RX ORDER — AMOXICILLIN 250 MG
1 CAPSULE ORAL
Status: CANCELLED | OUTPATIENT
Start: 2018-09-10

## 2018-09-10 RX ORDER — KETAMINE HYDROCHLORIDE 100 MG/ML
INJECTION, SOLUTION INTRAMUSCULAR; INTRAVENOUS AS NEEDED
Status: DISCONTINUED | OUTPATIENT
Start: 2018-09-10 | End: 2018-09-10 | Stop reason: HOSPADM

## 2018-09-10 RX ORDER — CEFAZOLIN SODIUM/WATER 2 G/20 ML
2 SYRINGE (ML) INTRAVENOUS ONCE
Status: COMPLETED | OUTPATIENT
Start: 2018-09-10 | End: 2018-09-10

## 2018-09-10 RX ORDER — OXYCODONE AND ACETAMINOPHEN 7.5; 325 MG/1; MG/1
1-2 TABLET ORAL
Status: CANCELLED | OUTPATIENT
Start: 2018-09-10

## 2018-09-10 RX ORDER — SODIUM CHLORIDE 0.9 % (FLUSH) 0.9 %
5-10 SYRINGE (ML) INJECTION AS NEEDED
Status: DISCONTINUED | OUTPATIENT
Start: 2018-09-10 | End: 2018-09-10 | Stop reason: HOSPADM

## 2018-09-10 RX ORDER — MIDAZOLAM HYDROCHLORIDE 1 MG/ML
2 INJECTION, SOLUTION INTRAMUSCULAR; INTRAVENOUS ONCE
Status: DISCONTINUED | OUTPATIENT
Start: 2018-09-10 | End: 2018-09-10 | Stop reason: HOSPADM

## 2018-09-10 RX ORDER — PROPOFOL 10 MG/ML
INJECTION, EMULSION INTRAVENOUS AS NEEDED
Status: DISCONTINUED | OUTPATIENT
Start: 2018-09-10 | End: 2018-09-10 | Stop reason: HOSPADM

## 2018-09-10 RX ORDER — KETOROLAC TROMETHAMINE 30 MG/ML
20 INJECTION, SOLUTION INTRAMUSCULAR; INTRAVENOUS ONCE
Status: DISCONTINUED | OUTPATIENT
Start: 2018-09-10 | End: 2018-09-10 | Stop reason: HOSPADM

## 2018-09-10 RX ORDER — SODIUM CHLORIDE 0.9 % (FLUSH) 0.9 %
5-10 SYRINGE (ML) INJECTION EVERY 8 HOURS
Status: CANCELLED | OUTPATIENT
Start: 2018-09-10

## 2018-09-10 RX ORDER — OXYCODONE HYDROCHLORIDE 5 MG/1
10 TABLET ORAL
Status: COMPLETED | OUTPATIENT
Start: 2018-09-10 | End: 2018-09-10

## 2018-09-10 RX ORDER — HYDROMORPHONE HYDROCHLORIDE 2 MG/ML
0.5 INJECTION, SOLUTION INTRAMUSCULAR; INTRAVENOUS; SUBCUTANEOUS
Status: DISCONTINUED | OUTPATIENT
Start: 2018-09-10 | End: 2018-09-10 | Stop reason: HOSPADM

## 2018-09-10 RX ORDER — PREGABALIN 150 MG/1
150 CAPSULE ORAL ONCE
Status: COMPLETED | OUTPATIENT
Start: 2018-09-10 | End: 2018-09-10

## 2018-09-10 RX ORDER — HYDROMORPHONE HYDROCHLORIDE 2 MG/ML
0.5 INJECTION, SOLUTION INTRAMUSCULAR; INTRAVENOUS; SUBCUTANEOUS
Status: COMPLETED | OUTPATIENT
Start: 2018-09-10 | End: 2018-09-10

## 2018-09-10 RX ORDER — FLUMAZENIL 0.1 MG/ML
0.2 INJECTION INTRAVENOUS AS NEEDED
Status: DISCONTINUED | OUTPATIENT
Start: 2018-09-10 | End: 2018-09-10 | Stop reason: HOSPADM

## 2018-09-10 RX ORDER — APREPITANT 40 MG/1
40 CAPSULE ORAL ONCE
Status: COMPLETED | OUTPATIENT
Start: 2018-09-10 | End: 2018-09-10

## 2018-09-10 RX ORDER — SODIUM CHLORIDE, SODIUM LACTATE, POTASSIUM CHLORIDE, CALCIUM CHLORIDE 600; 310; 30; 20 MG/100ML; MG/100ML; MG/100ML; MG/100ML
75 INJECTION, SOLUTION INTRAVENOUS CONTINUOUS
Status: DISCONTINUED | OUTPATIENT
Start: 2018-09-10 | End: 2018-09-10 | Stop reason: HOSPADM

## 2018-09-10 RX ORDER — FENTANYL CITRATE 50 UG/ML
INJECTION, SOLUTION INTRAMUSCULAR; INTRAVENOUS AS NEEDED
Status: DISCONTINUED | OUTPATIENT
Start: 2018-09-10 | End: 2018-09-10 | Stop reason: HOSPADM

## 2018-09-10 RX ORDER — MIDAZOLAM HYDROCHLORIDE 1 MG/ML
2 INJECTION, SOLUTION INTRAMUSCULAR; INTRAVENOUS
Status: DISCONTINUED | OUTPATIENT
Start: 2018-09-10 | End: 2018-09-10 | Stop reason: HOSPADM

## 2018-09-10 RX ORDER — DIPHENHYDRAMINE HYDROCHLORIDE 50 MG/ML
12.5 INJECTION, SOLUTION INTRAMUSCULAR; INTRAVENOUS
Status: DISCONTINUED | OUTPATIENT
Start: 2018-09-10 | End: 2018-09-10 | Stop reason: HOSPADM

## 2018-09-10 RX ORDER — SODIUM CHLORIDE 0.9 % (FLUSH) 0.9 %
5-10 SYRINGE (ML) INJECTION EVERY 8 HOURS
Status: DISCONTINUED | OUTPATIENT
Start: 2018-09-10 | End: 2018-09-10 | Stop reason: HOSPADM

## 2018-09-10 RX ORDER — LIDOCAINE HYDROCHLORIDE 10 MG/ML
0.1 INJECTION INFILTRATION; PERINEURAL AS NEEDED
Status: DISCONTINUED | OUTPATIENT
Start: 2018-09-10 | End: 2018-09-10 | Stop reason: HOSPADM

## 2018-09-10 RX ORDER — NALOXONE HYDROCHLORIDE 0.4 MG/ML
0.1 INJECTION, SOLUTION INTRAMUSCULAR; INTRAVENOUS; SUBCUTANEOUS
Status: DISCONTINUED | OUTPATIENT
Start: 2018-09-10 | End: 2018-09-10 | Stop reason: HOSPADM

## 2018-09-10 RX ORDER — ACETAMINOPHEN 10 MG/ML
1000 INJECTION, SOLUTION INTRAVENOUS ONCE
Status: COMPLETED | OUTPATIENT
Start: 2018-09-10 | End: 2018-09-10

## 2018-09-10 RX ORDER — DEXAMETHASONE SODIUM PHOSPHATE 4 MG/ML
INJECTION, SOLUTION INTRA-ARTICULAR; INTRALESIONAL; INTRAMUSCULAR; INTRAVENOUS; SOFT TISSUE AS NEEDED
Status: DISCONTINUED | OUTPATIENT
Start: 2018-09-10 | End: 2018-09-10 | Stop reason: HOSPADM

## 2018-09-10 RX ORDER — SODIUM CHLORIDE 0.9 % (FLUSH) 0.9 %
5-10 SYRINGE (ML) INJECTION AS NEEDED
Status: CANCELLED | OUTPATIENT
Start: 2018-09-10

## 2018-09-10 RX ORDER — OXYCODONE HYDROCHLORIDE 5 MG/1
5 TABLET ORAL
Status: DISCONTINUED | OUTPATIENT
Start: 2018-09-10 | End: 2018-09-10 | Stop reason: HOSPADM

## 2018-09-10 RX ORDER — LIDOCAINE HYDROCHLORIDE 20 MG/ML
INJECTION, SOLUTION EPIDURAL; INFILTRATION; INTRACAUDAL; PERINEURAL AS NEEDED
Status: DISCONTINUED | OUTPATIENT
Start: 2018-09-10 | End: 2018-09-10 | Stop reason: HOSPADM

## 2018-09-10 RX ORDER — GLYCOPYRROLATE 0.2 MG/ML
INJECTION INTRAMUSCULAR; INTRAVENOUS AS NEEDED
Status: DISCONTINUED | OUTPATIENT
Start: 2018-09-10 | End: 2018-09-10 | Stop reason: HOSPADM

## 2018-09-10 RX ORDER — CEFAZOLIN SODIUM/WATER 2 G/20 ML
2 SYRINGE (ML) INTRAVENOUS EVERY 8 HOURS
Status: CANCELLED | OUTPATIENT
Start: 2018-09-10 | End: 2018-09-11

## 2018-09-10 RX ORDER — ROPIVACAINE HYDROCHLORIDE 5 MG/ML
INJECTION, SOLUTION EPIDURAL; INFILTRATION; PERINEURAL AS NEEDED
Status: DISCONTINUED | OUTPATIENT
Start: 2018-09-10 | End: 2018-09-10 | Stop reason: HOSPADM

## 2018-09-10 RX ORDER — NEOSTIGMINE METHYLSULFATE 1 MG/ML
INJECTION INTRAVENOUS AS NEEDED
Status: DISCONTINUED | OUTPATIENT
Start: 2018-09-10 | End: 2018-09-10 | Stop reason: HOSPADM

## 2018-09-10 RX ORDER — CELECOXIB 200 MG/1
400 CAPSULE ORAL ONCE
Status: COMPLETED | OUTPATIENT
Start: 2018-09-10 | End: 2018-09-10

## 2018-09-10 RX ADMIN — NEOSTIGMINE METHYLSULFATE 2 MG: 1 INJECTION INTRAVENOUS at 10:41

## 2018-09-10 RX ADMIN — FENTANYL CITRATE 50 MCG: 50 INJECTION, SOLUTION INTRAMUSCULAR; INTRAVENOUS at 08:23

## 2018-09-10 RX ADMIN — GLYCOPYRROLATE 0.2 MG: 0.2 INJECTION INTRAMUSCULAR; INTRAVENOUS at 10:41

## 2018-09-10 RX ADMIN — HYDROMORPHONE HYDROCHLORIDE 0.5 MG: 2 INJECTION, SOLUTION INTRAMUSCULAR; INTRAVENOUS; SUBCUTANEOUS at 11:34

## 2018-09-10 RX ADMIN — SODIUM CHLORIDE, SODIUM LACTATE, POTASSIUM CHLORIDE, AND CALCIUM CHLORIDE: 600; 310; 30; 20 INJECTION, SOLUTION INTRAVENOUS at 09:57

## 2018-09-10 RX ADMIN — PREGABALIN 150 MG: 150 CAPSULE ORAL at 07:30

## 2018-09-10 RX ADMIN — OXYCODONE HYDROCHLORIDE 10 MG: 5 TABLET ORAL at 11:48

## 2018-09-10 RX ADMIN — ONDANSETRON 4 MG: 2 INJECTION INTRAMUSCULAR; INTRAVENOUS at 10:17

## 2018-09-10 RX ADMIN — HYDROMORPHONE HYDROCHLORIDE 0.5 MG: 2 INJECTION, SOLUTION INTRAMUSCULAR; INTRAVENOUS; SUBCUTANEOUS at 11:05

## 2018-09-10 RX ADMIN — LIDOCAINE HYDROCHLORIDE 50 MG: 20 INJECTION, SOLUTION EPIDURAL; INFILTRATION; INTRACAUDAL; PERINEURAL at 08:23

## 2018-09-10 RX ADMIN — FENTANYL CITRATE 25 MCG: 50 INJECTION, SOLUTION INTRAMUSCULAR; INTRAVENOUS at 09:52

## 2018-09-10 RX ADMIN — HYDROMORPHONE HYDROCHLORIDE 0.5 MG: 2 INJECTION, SOLUTION INTRAMUSCULAR; INTRAVENOUS; SUBCUTANEOUS at 11:40

## 2018-09-10 RX ADMIN — FENTANYL CITRATE 25 MCG: 50 INJECTION, SOLUTION INTRAMUSCULAR; INTRAVENOUS at 09:27

## 2018-09-10 RX ADMIN — ROCURONIUM BROMIDE 35 MG: 10 INJECTION, SOLUTION INTRAVENOUS at 08:23

## 2018-09-10 RX ADMIN — SODIUM CHLORIDE, SODIUM LACTATE, POTASSIUM CHLORIDE, AND CALCIUM CHLORIDE 75 ML/HR: 600; 310; 30; 20 INJECTION, SOLUTION INTRAVENOUS at 08:00

## 2018-09-10 RX ADMIN — ROCURONIUM BROMIDE 10 MG: 10 INJECTION, SOLUTION INTRAVENOUS at 09:38

## 2018-09-10 RX ADMIN — PROMETHAZINE HYDROCHLORIDE 3.12 MG: 25 INJECTION INTRAMUSCULAR; INTRAVENOUS at 11:04

## 2018-09-10 RX ADMIN — Medication 2 G: at 08:29

## 2018-09-10 RX ADMIN — KETAMINE HYDROCHLORIDE 28 MG: 100 INJECTION, SOLUTION INTRAMUSCULAR; INTRAVENOUS at 08:25

## 2018-09-10 RX ADMIN — ACETAMINOPHEN 1000 MG: 10 INJECTION, SOLUTION INTRAVENOUS at 11:12

## 2018-09-10 RX ADMIN — PROPOFOL 150 MG: 10 INJECTION, EMULSION INTRAVENOUS at 08:23

## 2018-09-10 RX ADMIN — ROCURONIUM BROMIDE 10 MG: 10 INJECTION, SOLUTION INTRAVENOUS at 09:11

## 2018-09-10 RX ADMIN — DEXAMETHASONE SODIUM PHOSPHATE 4 MG: 4 INJECTION, SOLUTION INTRA-ARTICULAR; INTRALESIONAL; INTRAMUSCULAR; INTRAVENOUS; SOFT TISSUE at 09:34

## 2018-09-10 RX ADMIN — KETAMINE HYDROCHLORIDE 14 MG: 100 INJECTION, SOLUTION INTRAMUSCULAR; INTRAVENOUS at 09:26

## 2018-09-10 RX ADMIN — PROPOFOL 30 MG: 10 INJECTION, EMULSION INTRAVENOUS at 10:14

## 2018-09-10 RX ADMIN — APREPITANT 40 MG: 40 CAPSULE ORAL at 07:30

## 2018-09-10 RX ADMIN — HYDROMORPHONE HYDROCHLORIDE 0.5 MG: 2 INJECTION, SOLUTION INTRAMUSCULAR; INTRAVENOUS; SUBCUTANEOUS at 11:14

## 2018-09-10 RX ADMIN — TAPENTADOL HYDROCHLORIDE 100 MG: 50 TABLET, FILM COATED ORAL at 07:30

## 2018-09-10 RX ADMIN — CELECOXIB 400 MG: 200 CAPSULE ORAL at 07:30

## 2018-09-10 NOTE — OP NOTES
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]  Gluteal tendinitis, left hip [M76.02]     Postoperative diagnosis: Left Hip Labral Tear, FRANKI      Surgeon(s) and Role:     * Mariel Mello MD - Primary     Assistant: none    Anesthesia: General, fascia iliaca block    Antibiotics: Ancef 2 grams IV    Procedures:  Procedure(s):  LEFT HIP  - ARTHROSCOPY/ FEMOROPLASTY/ LABRAL REPAIR   to include radiographic and manual examination under anesthesia(CPT 12526)  16000  [id number]      TRACTION TIME: 49 total Minutes    INDICATIONS: This patient is a 41year old female with clinical and radiographic examinations consistent with an anterior superior labral tear in conjunction with femoracetabular impingement. They have failed conservative course of management including but not limited to NSAID's, physical therapy with a home exercise program, activity modification, and intra-articular injection. After in-depth discussion of options, given the radiographic findings or pathology noted and mechanical nature of their symptoms, that surgical intervention would be appropriate. We discussed the risks and benefits including but not limited to infection, heterotopic ossification, injury to nerves and vessels, stiffness, need for further surgery, DVT, PE, MI, and other anesthesia related risks. We discussed the expected post operative regimen and return to activities as well as limitations for weight bearing or ROM in certain cases. We also discussed post operative use of Naprosyn for 4 weeks for HO prevention. All questions were answered and they were amenable to proceeding. The operative extremity was also marked by the patient and myself with indelible marker. FINDINGS:  1. Synovium - minimal synovial irritation. 2. Labrum - Anterior superior labral tear with mild instability. The quality overall was good.  There was mild fraying posterolateral.   3. Chondrolabral junction - mild wave sign. 4. Cartilage, fovea, ligamentum -  The femoral head and acetabular cartilage appeared normal. The fovea and ligamentum were normal.  5. Rim - The rim was fairly normal with no sign of significant rim lesion. 6. Periphery - The was a moderate sized cam lesion present. PROCEDURE DETAILS:  The patient was taken to the operating room and all areas of pressure were well padded. Preoperative antibiotics, general anesthetic, and intubation was performed by the Anesthesia Team.  After adequate general anesthesia was obtained, the patient was positioned supine on the table using the hip arthroscopy attachment. An oversized well padded perineal post and well padded traction boots were applied to both lower extremities. A sequential compression device was placed on the non-operative lower extremity with an SCD. Minimal provisional traction was applied to the non-operative leg. Provisional traction was then applied to the operative leg using flouroscopy to confirm that adequate traction could be applied. Prior to the beginning of the procedure, a time-out was performed for correct surgical site identification as marked during the pre-operative meeting. This was confirmed using the written consent and history/physical. Time-out for antibiotic dosing, timing and selection was also performed. Prior to beginning of the case as well as during the entire case, radiologic exam was performed and interpreted. In the begining, 6 preoperative images were obtained. Zero degrees of flexion with 20 internal rotation, neutral rotation, and 30 degrees of external rotation. Then with the hip in 40-50 degrees, neutral rotation, 40 degrees of external rotation, and 60 degrees of external rotation to identify the femoral deformity from 11:45 to 2:45 position. These images are used for surgical guidance and for proper completion of the procedure.     The operative hip was then prepped using Chloroprep and draped in the standard fashion. Once this was confirmed, formal traction was applied to the operative hip in slight flexion, neutral rotation and slight abduction. A standard anterior peritrochanteric portal was established at a point just anterior and superior to the superior proximal tip of the greater trochanter. Image intensification in the AP plane was used as a guide for needle localization intra-articularly. An air arthrogram was identified with c-arm to confirm needle placement and then a nitinol wire was placed again indicating appropriate position of the starting point intra-articularly. A superficial incision was made with an 11 blade scalpel and blunt dissection through the IT band was performed using a 5 mm cannula. The cannula was advanced atraumatically into the joint using flouroscopy when needed. The anterior triangle was visualized and a mid-anterior portal was established about 6 cm distal and with an angle of about 50 degrees off from the axial plane from the anterolateral christopher-trochanteric portal. This was done using needle localization under direct visualization. The nitinol wire was placed and a superficial skin incision was performed with the 11 blade. A straight hemostat was initially used to superficially dissect through the soft tissue, then a 5 mm cannula was inserted into the joint. A capsulotomy was performed connecting the mid-anterior portal to the anterior christopher-trochanteric portal using a [place] blade. A motorized shaver was used for resecting the synovium peripherally. Radiofrequency ablation was used for cauterization when needed. The central compartment was examined with the probe and the scope. As noted, there was evidence of labral deformation and tearing from 9 to11  o'clock. Chondrolabral changes were also noted as well. The ligamentum and the inner most aspect of the acetabulum and femoral head appeared as noted. Once the acetabular rim was defined, the labrum was meticulously prepared trying not to separate the labrum from the articular cartilage unless needed. During the preparation of the labrum, any areas of excess bone and unstable chondrolabral articular cartilage were identified. Using biters and motorized lilia the area of unstable or excess cartilage was stabilized to more of a normal chondrolabral junction. The motorized natan was used to create a healthy bleeding bed of bone for labral refixation. Using flouroscopy and direct visualization the labrum was fixed using two meghann-tack anchors in parallel fashion tangential to the articular margin. Using the guide the rim was drilled and the anchor then inserted. The labral fixation was done by placing the anchor stitch through the space between the rim and the labrum and then using a labral penetrator to pass the stitch through the labrum. A luggage tag looping stitch was used at the 12 O'clock position to prevent inversion of the labrum with weightbearing. The stitch was tied using standard arthroscopic technique. This was done in sequential fashion until the labrum was stable throughout. The anchors were placed in the 10 and 11 o'clock positions. Good reapproximation of the labrum to the chondral margin was obtained establishing the suction seal function of the labrum. The traction was removed at this time. Attention was then turned to the peripheral compartment. With traction removed, the leg was flexed to about 40-50 degrees and using abduction and adduction the femoral head neck junction was visualized. Flouroscopy and direct visualization was used to identify the area of cam impingement. Using the curets, motorized shaver and radiofrequency device the superficial area of planned bone resection was marked. This was checked with flouroscopy.  The 5.5 mm natan was then used to perform the osteochondroplasty along the femur re-creating the appropriate head neck off set. The hip was checked in abduction, flexion, internal rotation dynamically and was noted to clear nicely without further evidence of impingement. The previous films were repeated and checked as well. These include zero degrees of  flexion with 20 internal rotation, neutral rotation, and 30 degrees of external rotation. Then with the hip in 40-50 degrees, neutral rotation, 40 degrees of external rotation, and 60 degrees of external rotation to identify the femoral deformity correction from 11:45 to 2:45 position. The hardware and bony alignment, position and orientation of resection were appropriate and consistent with the procedure and without evidence of complication. The bony debris generated from the natan was then irrigated. The capsule was then repaired in the intra-portal capsulotomy using two #2 Vicryl absorbable stitches. The leg was in slight flexion and internal rotation and the capsular stitches tied in standard arthroscopic fashion to approximate the capsular edges. Instruments were removed. 20 ml's of ropivicaine was injected into the soft tissues. A 3-0 Nylon stitch was used to close the portals. A sterile dressing was applied. The patient was awoken in stable condition and taken to the recovery room. All counts were correct. POSTOPERATIVE PLAN: TDWB with crutches according to the labral repair protocol. Estimated Blood Loss:   5 ml    Fluids:    see anesthesia notes. Implant:   Implant Name Type Inv.  Item Serial No.  Lot No. LRB No. Used Action   ANCHOR SUT 1.4MM W/FLEX Munirachshanita Lerner - [id number]   ANCHOR SUT 1.4MM W/FLEX INSRTR -- [employer]ANOT[employer]   [employer] [id number] Left 2 Implanted       Closure: Primary    Complications: None    Signed By: Michelle Valladares MD

## 2018-09-10 NOTE — BRIEF OP NOTE
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 
Circ-1: Maksim Qiu RN 
Circ-Relief: Osbaldo Escobar RN Radiology Technician: Estevan Glynn RT, [address] [place] [address] Scrub Tech-1: Mukund Miller Scrub Tech-2: [facility] Time In Incision Start 1332 Incision Close 1038 Anesthesia: General  
Estimated Blood Loss: 5 ml Specimens: * No specimens in log * Findings: Left hip labral tear, cam impingement. Complications: none Implants:  
Implant Name Type Inv. Item Serial No.  Lot No. LRB No. Used Action ANCHOR SUT 1.4MM W/FLEX Jose Daniel Conrad - [id number]   ANCHOR SUT 1.4MM W/FLEX INSRTR -- [employer]ANOT[employer]   [employer] [id number] Left 2 Implanted

## 2018-09-10 NOTE — IP AVS SNAPSHOT
Lois Dickinson 
 
 
 [address] [facility] [address] [facility] 
[phone] Patient: Yesenia Nunez MRN: [id number] :1980 About your hospitalization You were admitted on:  September 10, 2018 You last received care in the:  [facility] OP PACU You were discharged on:  September 10, 2018 Why you were hospitalized Your primary diagnosis was:  Not on File Your diagnoses also included:  Status Post Hip Surgery Follow-up Information Follow up With Details Comments Contact Info Renya Oliva MD Follow up on 2018 10:35 at [address] [facility] [address] 
[phone] Discharge Orders None A check evaristo indicates which time of day the medication should be taken. My Medications ASK your doctor about these medications Instructions Each Dose to Equal  
 Morning Noon Evening Bedtime PREVACID 15 mg capsule Generic drug:  lansoprazole Your last dose was: Your next dose is: Take 15 mg by mouth daily as needed. 15 mg Discharge Instructions Post-Operative Instructions Hip Arthroscopy Post-Operative Worksheet ? PAIN 
o Most patients require some narcotic medication after surgery. You will be given a prescription(s) with instructions for its use. Do not take more than prescribed. If your pain is not adequately controlled, contact the surgeon on call. Phone numbers are provided. o Common side effects of the narcotics include nausea, vomiting, drowsiness, constipation, and difficulty urinating. If you experience constipation, drink lots of water/Gatorade, avoid soda and diet drinks. Eat plenty of fiber. You may take a stool softener: Colace 100mg twice a day for the first week. For severe constipation use magnesium citrate, one 8 oz bottle. All can be bought at the pharmacy.  If you have difficulty urinating, try spending a little time out of bed on the crutches. If it is not possible for you to urinate and you become uncomfortable, it is best if you go to the Emergency Room to get catheterized. o Contact the office if you have nausea and vomiting. This is usually caused by the anesthesia or narcotics. We will either give you a medication for nausea at time of surgery or we will call it in to a pharmacy if you experience these symptoms. o Do not drive or make important business decisions while using narcotics. o Do not take extra Tylenol if the pain medication given to you already has Tylenol in it. 
o Typically naprosyn or indomethacin will be prescribed for use after your hip surgery. This may help prevent extra bone from developing after this type of surgery. Some patients may need to take an over the counter proton pump inhibitor for heartburn if they have significant stomach irritation while taking an anti-inflammatory medicine. A pharmacist or your doctor should be able to help you find this. If severe irritation continues discontinue use of the medicine and contact your doctor. o You may experience low back pain due to muscle spasm from your procedure. If so, apply a heating pad to the area and take your pain medication if you have not done so. 
o You may experience numbness or tingling (neurapraxia) in your groin, incision sites, thigh, or foot. It is typically temporary and may resolve in a few days or up to several weeks. ? WOUND CARE 
o It is common for some staining of the bandage to occur. If this happens, reinforce the area with additional bandages. Please do not use bacitracin or other ointments under the bandages. o Your thigh will be swollen from the arthroscopy fluid for the first couple of days, this is normal and will resolve over time. o Keep dressing dry and clean.   You may remove the surgical bandages 4-7 days after surgery unless otherwise informed. Leave the steri-strips (sticky strips over incision) and/or sutures in place and do not remove. Re-dress the incision with a light dressing. 
o To avoid problems with infection, keep incision clean and dry. You may shower after post operative day four if the incisions are dry. Do not scrub the incisions. Gently pat the area dry after showering. Do not soak incision (bath, hot tub) until the skin is fully healed. If there is any concern about the incision, please call the on call the office or doctor on call. 
o A low grade temperature is very common within the first few days of surgery. This can often be treated with getting out of bed in a sitting or standing position, deep breathing and coughing to clear the lungs. If fevers, pain or swelling continue, please call. ? ACTIVITY 
o Elevate the operative leg above the level of your heart as much as possible during the first week. This will help with pain and swelling. Elevate leg with a couple of pillows placed under your ankle/foot (to keep the knee from sitting in a flexed or bent position). o Weight bearing instructions and crutch use per physician. Most hip scopes will be TTWB for at least 4 weeks. o Use the CPM (motion machine) as instructed. I would like you to use the CPM right away, even before physical therapy. o Avoid prolonged sitting or long distance traveling for 2-3 weeks if possible. If not call or ask for instructions. o May return to sedentary work or school in 3-7 days if tolerated. 
o See Rehab section ? DIET 
o Begin with clear fluids and light foods (jello, clear broths). Progress to a regular diet as tolerated. ? ICE 
o Use ice packs for 30 minutes on, 30 minutes off until swelling has subsided. You may have been provided an ice machine that you may use in a similar fashion or as directed by the instructions for the machine. ?  EXERCISE 
 o Physical therapy should start as soon as possible after the day of your surgery. A rehabilitation protocol will be sent to your therapist. Have your therapist contact our office with any questions. o Begin quad sets and heel slides if motion exercises cleared with physician.   
o Move ankle up and down throughout the day to help blood flow and decrease the chance of a blood clot. o Do exercises 3-4 times a day until your first post operative visit. Do exercises unless instructed to stay immobilized. ? CONCERNS/QUESTIONS 
o If you feel unrelenting pain, notice incision redness, continuous drainage or bleeding from wounds, continued fevers greater than 101°, difficulty breathing or excessive nausea/vomiting, please call [phone] during regular office hours or after 4:00 pm or on weekends. o If you have an emergency that requires immediate attention, proceed to the nearest Emergency Room. ? FOLLOW UP APPOINTMENTS 
o If you do not already have a follow up appointment scheduled, please call [phone] during normal office hours and ask to schedule an appointment approximately 1-2 weeks post op. ? IMPORTANT NUMBERS 
o Questions ? During Office Hours (8:00-4:00) ? (Medical Assistant) [phone] ? After Hours (Tell the hospital  your surgeons name and they will contact the appropriate on call physician) ? [phone] 
o Office Appointment Scheduling ? [phone] Rehab: ?WB Status : Foot Flat with 20 lb of pressure - TDWB. Duration 4 weeks ( average is 4- 6 weeks) ? CPM Start 30  70 degrees, Increase as tolerated 0  90 degrees. DO NOT USE AT NIGHT WHILE SLEEPING. May change to varying degrees of abduction (away from midline) less than 20 degrees to help mobilize tissue. Duration 4 hours broken up. Passive Circumduction of the hip while laying supine, avoid external rotation. Helps prevent adhesions. Sleeping Boots or Ace Wrap feet when sleeping for 2 weeks (average: 2-4 weeks) *this prevents the foot from rotating to the side and putting stress on the repair. Stationary Bicycle: Immediate Post-op, 1 - 2 times / day x 15 - 20 minutes *Avoid pinching in front of hip by setting seat high Pool Exercises: Begin P.O. Day #14 or as soon as sutures are removed and wound is healed. General Considerations:  Typically requires 3 months of supervised therapy  Month 1: Tissue Healing Phase (1 x per week) o Goals: Pain Control & Decrease tissue inflammation - Decrease swelling - Maintenance of motion (flexion 0 - 90\"; IR as tolerated; ER 0 - 30')  Month 2: Early Functional Recovery (2X per week) - Goals:   
  -Full PROM with progression to Full AROM 
  -Early strength gains -AVOID FLEXOR TENDONITIS AND ABDUCTOR TENDONITIS!!! 
      Month 3: Late Functional Recovery (3 x per week) - Goals: Advance strength gains - focus on abductor and hip flexor strength. - Balance and proprioception. 
 - Continue to monitor for development of tendonitis. - Progress to sport specific activity in months 4 and 5 depending on strength. - Do not progress to running until abductor strength is equal to contralateral side. - Progression to sport specific activities requires full strength return and muscle coordination. Caution  Avoid anything which causes either anterior or lateral impingement.  Be aware of Low Back of SI Joint Dysfunction.  Pay close attention for the onset of Flexor Tendonitis and Abductor Tendonitis.  Patients with preoperative weakness in proximal hip musculature are at increased risk for postoperative tendonitis.  Modification of activity with focus on decreasing inflammation takes precedent if tendonitis occurs. This is not uncommon within the first 3 months of tx.   
 Athletes or Work Condition individuals may be progressed faster unless microfracture or osteochondroplasty procedures have been performed. Learning About How to Use Crutches Your Care Instructions Crutches can help you walk when you have an injured hip, leg, knee, ankle, or foot. Your doctor will tell you how much weight-if any-you can put on your leg. Be sure your crutches fit you. When you stand up in your normal posture, there should be space for two or three fingers between the top of the crutch and your armpit. When you let your hands hang down, the hand  should be at your wrists. When you put your hands on the hand , your elbows should be slightly bent. To stay safe when using crutches: 
· Look straight ahead, not down at your feet. · Clear away small rugs, cords, or anything else that could cause you to trip, slip, or fall. · Be very careful around pets and small children. They can get in your path when you least expect it. · Be sure the rubber tips on your crutches are clean and in good condition to help prevent slipping. · Avoid slick conditions, such as wet floors and snowy or icy driveways. In bad weather, be especially careful on curbs and steps. How to use crutches Getting ready to walk 1. Bend your elbows slightly. Press the padded top parts of the crutches against your sides, under your armpits. 2. If you have been told not to put any weight on your injured leg, keep that leg bent and off the ground. Walking with crutches 1. Put both crutches about 12 inches in front of you. 2. Put your weight on the handgrips, not on the pads under your arms. (Constant pressure against your underarms can cause numbness.) Swing your body forward. (If you have been told not to put any weight on your injured leg, keep that leg bent and off the ground.) 3. To complete the step, put your weight on the strong leg. 4. Move your crutches about 12 inches in front of you, and start the next step. 5. When you're confident using the crutches, you can move the crutches and your injured leg at the same time. Then push straight down on the crutches as you step past the crutches with your strong leg, as you would in normal walking. 6. Take small steps. 7. Use ramps and elevators when you can. Sitting down 1. To sit, back up to the chair. Use one hand to hold both crutches by the handgrips, beside your injured leg. With the other hand, hold onto the seat and slowly lower yourself onto the chair. 2. Lay the crutches on the ground near your chair. If you prop them up, they may fall over. Getting up from a chair 1. To get up from a chair,  the crutches and put them in one hand beside your injured leg. 2. Put your weight on the handgrips of the crutches and on your strong leg to stand up. Walking up stairs 1. To go up stairs, step up with your strong leg and then bring the crutches and your injured leg to the upper step. 2. For stairs that have handrails: Put both crutches under the arm opposite the handrail. Use the hand opposite the handrail to hold both crutches by the handgrips. 3. Hold onto the handrail as you go up. Put your strong leg on the step first when you go up. Walking down stairs 1. To go down stairs, put your crutches and injured leg on the lower step. 2. Bring your strong leg to the lower step. This saying may help you remember: \"Up with the good, down with the bad. \" 3. For stairs that have handrails: Put both crutches under the arm opposite the handrail. Use the hand opposite the handrail to hold both crutches by the handgrips. Hold onto the handrail as you go down. Follow the same process you use for stairs: Lead with your crutches and injured leg on the way down. Follow-up care is a key part of your treatment and safety.  Be sure to make and go to all appointments, and call your doctor if you are having problems. It's also a good idea to know your test results and keep a list of the medicines you take. Where can you learn more? Go to http://alanna-sally.info/. Enter Y702 in the search box to learn more about \"Learning About How to Use Crutches. \" Current as of: March 21, 2017 Content Version: 11.4 © 8663-4774 [employer]. Care instructions adapted under license by [employer] (which disclaims liability or warranty for this information). If you have questions about a medical condition or this instruction, always ask your healthcare professional. [place] [address] any warranty or liability for your use of this information. TYPICAL SIDE EFFECTS OF PAIN MEDICATION: 
*    Constipation: Drink lots of fluids. Over the counter stool softener if needed. *    Nausea: Take pain medication with food. Call your doctor with persistent nausea. ACTIVITY · As tolerated and as directed by your doctor. · Bathe or shower as directed by your doctor. DIET 
· Day of surgery: Clear liquids until no nausea or vomiting; small portion, light diet [place] foods (ex: baked chicken, plain rice, grits, scrambled eggs, toast). Nothing greasy, fried or spicy today. · Advance to regular diet on second day, unless your doctor orders otherwise. · If nausea and vomiting continues, call your doctor. PAIN 
· Take pain medication as directed by your doctor. · DO NOT take aspirin or blood thinners unless directed by your doctor. CALL YOUR DOCTOR IF   
s Call your doctor if pain is NOT relieved by medication.  
s Excessive bleeding that does not stop after holding pressure over the area · Temperature of 101 degrees F or above · Excessive redness, swelling or bruising, and/ or green or yellow, smelly discharge from incision AFTER ANESTHESIA · For the first 24 hours: DO NOT Drive, Drink alcoholic beverages, or Make important decisions. · Be aware of dizziness following anesthesia and while taking pain medication. DISCHARGE SUMMARY from Nurse PATIENT INSTRUCTIONS: 
 
After general anesthesia or intravenous sedation, for 24 hours or while taking prescription Narcotics: · Limit your activities · Do not drive and operate hazardous machinery · Do not make important personal or business decisions · Do  not drink alcoholic beverages · If you have not urinated within 8 hours after discharge, please contact your surgeon on call. *  Please give a list of your current medications to your Primary Care Provider. *  Please update this list whenever your medications are discontinued, doses are 
    changed, or new medications (including over-the-counter products) are added. *  Please carry medication information at all times in case of emergency situations. Preventing Infection at Home We care about preventing infection and avoiding the spread of germs  not only when you are in the hospital but also when you return home. When you return home from the hospital, its important to take the following steps to help prevent infection and avoid spreading germs that could infect you and others. Ask everyone in your home to follow these guidelines, too. Clean Your Hands · Clean your hands whenever your hands are visibly dirty, before you eat, before or after touching your mouth, nose or eyes, and before preparing food. Clean them after contact with body fluids, using the restroom, touching animals or changing diapers. · When washing hands, wet them with warm water and work up a lather. Rub hands for at least 15 seconds, then rinse them and pat them dry with a clean towel or paper towel. · When using hand sanitizers, it should take about 15 seconds to rub your hands dry. If not, you probably didnt apply enough . Cover Your Sneeze or Cough Germs are released into the air whenever you sneeze or cough.  To prevent the spread of infection: · Turn away from other people before coughing or sneezing. · Cover your mouth or nose with a tissue when you cough or sneeze. Put the tissue in the trash. · If you dont have a tissue, cough or sneeze into your upper sleeve, not your hands. · Always clean your hands after coughing or sneezing. Care for Wounds Your skin is your bodys first line of defense against germs, but an open wound leaves an easy way for germs to enter your body. To prevent infection: · Clean your hands before and after changing wound dressings, and wear gloves to change dressings if recommended by your doctor. · Take special care with IV lines or other devices inserted into the body. If you must touch them, clean your hands first. 
· Follow any specific instructions from your doctor to care for your wounds. Contact your doctor if you experience any signs of infection, such as fever or increased redness at the surgical or wound site. Keep a [place] [address] · Clean or wipe commonly touched hard surfaces like door handles, sinks, tabletops, phones and TV remotes. · Use products labeled disinfectant to kill harmful bacteria and viruses. · Use a clean cloth or paper towel to clean and dry surfaces. Wiping surfaces with a dirty dishcloth, sponge or towel will only spread germs. · Never share toothbrushes, mclaughlin, drinking glasses, utensils, razor blades, face cloths or bath towels to avoid spreading germs. · Be sure that the linens that you sleep on are clean. · Keep pets away from wounds and wash your hands after touching pets, their toys or bedding. We care about you and your health. Remember, preventing infections is a team effort between you, your family, friends and health care providers. These are general instructions for a healthy lifestyle: No smoking/ No tobacco products/ Avoid exposure to second hand smoke Surgeon General's Warning:  Quitting smoking now greatly reduces serious risk to your health. Obesity, smoking, and sedentary lifestyle greatly increases your risk for illness A healthy diet, regular physical exercise & weight monitoring are important for maintaining a healthy lifestyle You may be retaining fluid if you have a history of heart failure or if you experience any of the following symptoms:  Weight gain of 3 pounds or more overnight or 5 pounds in a week, increased swelling in our hands or feet or shortness of breath while lying flat in bed. Please call your doctor as soon as you notice any of these symptoms; do not wait until your next office visit. Recognize signs and symptoms of STROKE: 
 
F-face looks uneven A-arms unable to move or move unevenly S-speech slurred or non-existent T-time-call 911 as soon as signs and symptoms begin-DO NOT go Back to bed or wait to see if you get better-TIME IS BRAIN. Introducing [facility]! Mirella Campos introduces Tapit patient portal. Now you can access parts of your medical record, email your doctor's office, and request medication refills online. 1. In your internet browser, go to [email or web address] 2. Click on the First Time User? Click Here link in the Sign In box. You will see the New Member Sign Up page. 3. Enter your Tapit Access Code exactly as it appears below. You will not need to use this code after youve completed the sign-up process. If you do not sign up before the expiration date, you must request a new code. · Tapit Access Code: [email or web address] Expires: 12/6/2018  6:41 AM 
 
4. Enter the last four digits of your Social Security Number (xxxx) and Date of Birth (mm/dd/yyyy) as indicated and click Submit. You will be taken to the next sign-up page. 5. Create a Tapit ID. This will be your Tapit login ID and cannot be changed, so think of one that is secure and easy to remember. 6. Create a SevenL[employer]ches password. You can change your password at any time. 7. Enter your Password Reset Question and Answer. This can be used at a later time if you forget your password. 8. Enter your e-mail address. You will receive e-mail notification when new information is available in [address] 9. Click Sign Up. You can now view and download portions of your medical record. 10. Click the Download Summary menu link to download a portable copy of your medical information. If you have questions, please visit the Frequently Asked Questions section of the SevenL[employer]ches website. Remember, SevenLunches is NOT to be used for urgent needs. For medical emergencies, dial 911. Now available from your iPhone and Android! Introducing Wero Aj As a [facility] patient, I wanted to make you aware of our electronic visit tool called Wero Aj. [facility] 24/7 allows you to connect within minutes with a medical provider 24 hours a day, seven days a week via a mobile device or tablet or logging into a secure website from your computer. You can access Wero Aj from anywhere in the [place]. A virtual visit might be right for you when you have a simple condition and feel like you just dont want to get out of bed, or cant get away from work for an appointment, when your regular Radha Loud provider is not available (evenings, weekends or holidays), or when youre out of town and need minor care. Electronic visits cost only $49 and if the [facility] 24/7 provider determines a prescription is needed to treat your condition, one can be electronically transmitted to a nearby pharmacy*. Please take a moment to enroll today if you have not already done so. The enrollment process is free and takes just a few minutes. To enroll, please download the [facility] 24/7 lizzy to your tablet or phone, or visit [email or web address] to enroll on your computer. And, as an [address] [place] [address] patient with a [employer] account, the results of your visits will be scanned into your electronic medical record and your primary care provider will be able to view the scanned results. We urge you to continue to see your regular [facility] provider for your ongoing medical care. And while your primary care provider may not be the one available when you seek a Wero Aj virtual visit, the peace of mind you get from getting a real diagnosis real time can be priceless. For more information on Wero Guzmanfin, view our Frequently Asked Questions (FAQs) at [email or web address]. Sincerely, 
 
Lauren Teixeira MD 
Chief Medical Officer Yannick Cori Cunningham *:  certain medications cannot be prescribed via Wero ThomasProtochips Unresulted Labs-Please follow up with your PCP about these lab tests Order Current Status NC XR TECHNOLOGIST SERVICE In process Providers Seen During Your Hospitalization Provider Specialty Primary office phone Abdon Daly MD Orthopedic Surgery [phone] Your Primary Care Physician (PCP) Primary Care Physician Office Phone Office Fax NONE ** None ** ** None ** You are allergic to the following Allergen Reactions Ceclor (Cefaclor) Hives Recent Documentation Weight BMI OB Status Smoking Status 56.7 kg 22.14 kg/m2 Having regular periods Never Smoker Emergency Contacts Name Discharge Info Relation Home Work Mobile Markos Huang  Spouse [3] [phone] Patient Belongings The following personal items are in your possession at time of discharge: 
  Dental Appliances: None Please provide this summary of care documentation to your next provider. Signatures-by signing, you are acknowledging that this After Visit Summary has been reviewed with you and you have received a copy. Patient Signature:  ____________________________________________________________ Date:  ____________________________________________________________  
  
Hilda Selene Provider Signature:  ____________________________________________________________ Date:  ____________________________________________________________

## 2018-09-10 NOTE — H&P
Outpatient Surgery History and Physical: 
Mayela Anaya was seen and examined. CHIEF COMPLAINT:   Left hip pain. PE: 
  
Visit Vitals  /78 (BP 1 Location: Left arm, BP Patient Position: Supine)  Pulse 78  Temp 98.9 °F (37.2 °C)  Resp 18  Wt 56.7 kg (125 lb)  LMP 08/23/2018  SpO2 100%  BMI 22.14 kg/m2 Heart:   Regular pulses. Lungs:  Normal respirations, no wheezing. Past Medical History:   
Patient Active Problem List  
 Diagnosis  Normal labor  Prior pregnancy with fetal demise and current pregnancy  Supervision of high-risk pregnancy  Rh negative status during pregnancy Antibody screen positive.  (titer too weak) - repeat 4 weeks Negative ABS on 12/03/2016  Elderly multigravida in first trimester NIPT low risk 12/12- level 2 anatomy and echo: WNL  Prior poor obstetrical history (3rd trimester fetal demise) Genetically normal; felt to be cord accident due to tight nuchal 
 
Would initiate 2x/week testing by 34 weeks. Would deliver at 39th weeks, sooner if concerns.  Family history of Down syndrome  Family history of hemophilia Surgical History:  
Past Surgical History:  
Procedure Laterality Date  HX COLONOSCOPY  2004 Social History: Patient  reports that she has never smoked. She has never used smokeless tobacco. She reports that she drinks alcohol. She reports that she does not use illicit drugs. Family History:  
Family History Problem Relation Age of Onset  Asthma Mother  Thyroid Disease Mother [facility] Arthritis-osteo Mother Jeananne Barer Bladder Disease Mother  Depression Mother  Diabetes Father  Hypertension Father  Elevated Lipids Father  Heart Disease Father  Alcohol abuse Father  Heart Attack Brother  Alcohol abuse Brother  Downs Syndrome Other Spouse's Uncle  Thyroid Disease Sister  Depression Sister  Hypertension Maternal Grandmother  Heart Disease Maternal Grandfather  Lung Disease Maternal Grandfather  Lung Disease Paternal Grandmother Allergies: Reviewed per EMR Allergies Allergen Reactions  Ceclor [Cefaclor] Hives Medications: No current facility-administered medications on file prior to encounter. Current Outpatient Prescriptions on File Prior to Encounter Medication Sig  
 lansoprazole (PREVACID) 15 mg capsule Take 15 mg by mouth daily as needed. The surgery is planned for the left hip. History and physical has been reviewed. The patient has been examined. There have been no significant clinical changes since the completion of the originally dated History and Physical. 
Patient identified by surgeon; surgical site was confirmed by patient and surgeon. The patient is here today for outpatient surgery. I have examined the patient, no changes are noted in the patient's medical status. Necessity for the procedure/care is still present and the history and physical above is current. See the office notes for the full long term history of the problem. Please see the recent office notes for the musculoskeletal examination.  
 
Signed By: Army Ramona MD   
 September 10, 2018 7:50 AM

## 2018-09-10 NOTE — PERIOP NOTES
Patient continues to complain of 5/10 hip pain. Dr Vianca Carlson notified. New orders received.  See [facility]

## 2018-09-10 NOTE — ANESTHESIA PREPROCEDURE EVALUATION
Anesthetic History No history of anesthetic complications Review of Systems / Medical History Patient summary reviewed and pertinent labs reviewed Pulmonary Within defined limits Neuro/Psych Within defined limits Cardiovascular Within defined limits Exercise tolerance: >4 METS 
  
GI/Hepatic/Renal 
  
GERD (Rare prn med): well controlled Endo/Other Within defined limits Other Findings Physical Exam 
 
Airway Mallampati: I 
TM Distance: 4 - 6 cm Neck ROM: normal range of motion Mouth opening: Normal 
 
 Cardiovascular Rhythm: regular Rate: normal 
 
 
 
 Dental 
No notable dental hx Pulmonary Breath sounds clear to auscultation Abdominal 
 
 
 
 Other Findings Anesthetic Plan ASA: 1 Anesthesia type: general 
 
 
 
 
 
Anesthetic plan and risks discussed with: Patient and Spouse

## 2018-09-10 NOTE — PERIOP NOTES
PACU DISCHARGE NOTE Vital signs stable, pain well controlled, alert and oriented times three or at baseline, no anesthetic complications. IV removed with catheter tip intact. Written and verbal discharge instructions given, including pain control, dressing care and follow up appointment. I have given crutches to the patient, adjusted them and provided complete instructions on safe use. Spouse, Markos,  verbalized understanding . All questions answered prior to discharge. Dr Emely Og okay to discharge at this time. Pt and all belongings taken via wheelchair and safely put in vehicle.

## 2018-09-10 NOTE — ANESTHESIA POSTPROCEDURE EVALUATION
Post-Anesthesia Evaluation and Assessment Patient: Merlinda Heap MRN: [id number]  SSN: [id number] YOB: 1980  Age: 41 y.o. Sex: female Cardiovascular Function/Vital Signs Visit Vitals  /79  Pulse (!) 49  Temp 36.4 °C (97.5 °F)  Resp 12  Wt 56.7 kg (125 lb)  SpO2 94%  BMI 22.14 kg/m2 Patient is status post general anesthesia for Procedure(s): LEFT HIP  - ARTHROSCOPY/ FEMOROPLASTY/ LABRAL REPAIR  . Nausea/Vomiting: None Postoperative hydration reviewed and adequate. Pain: 
Pain Scale 1: Numeric (0 - 10) (09/10/18 1140) Pain Intensity 1: 5 (09/10/18 1140) Managed Neurological Status:  
Neuro (WDL): Exceptions to WDL (09/10/18 1059) Neuro Neurologic State: Drowsy (09/10/18 1059) At baseline Mental Status and Level of Consciousness: Arousable Pulmonary Status:  
O2 Device: Room air (09/10/18 1128) Adequate oxygenation and airway patent Complications related to anesthesia: None Post-anesthesia assessment completed. No concerns Signed By: Allison Acuna MD   
 September 10, 2018

## 2018-09-12 ENCOUNTER — PATIENT OUTREACH (OUTPATIENT)
Dept: OTHER | Age: 38
End: 2018-09-12

## 2018-09-12 NOTE — PROGRESS NOTES
Transition Of Care Note Patient discharged from [facility] on 8/10/18 s/p Left Hip Arthroscopic Femoroplasty and labral repair; States spouse is a physician;       
 
Medical History:    
Past Medical History:  
Diagnosis Date  GERD (gastroesophageal reflux disease) occ--OTC MED  Left hip pain  Trauma age 16  
 left foot broken Care Manager contacted the patient by telephone to perform post hospital discharge assessment. Verified  and zip code with patient as identifiers. Provided introduction to self, and explanation of the Nurse Care Manager role.    
 
Asks (with baby crying in the background) if I could call back tomorrow sometime to which she agreed;  Plan to FU on outreach call tomorrow;

## 2018-09-12 NOTE — ACP (ADVANCE CARE PLANNING)
ECM offered patient information on Advanced Care Planning options. · Patient defers this topic to another time.

## 2018-09-13 ENCOUNTER — PATIENT OUTREACH (OUTPATIENT)
Dept: OTHER | Age: 38
End: 2018-09-13

## 2018-09-13 NOTE — PROGRESS NOTES
Outreach call to patient, following attempt to review and completed DC assessment with patient today as scheduled. No answer. Left discreet VM message with contact information requesting a return call.

## 2018-09-19 ENCOUNTER — PATIENT OUTREACH (OUTPATIENT)
Dept: OTHER | Age: 38
End: 2018-09-19

## 2018-09-19 NOTE — LETTER
Ms. Jessica Aviles [address] [address] [address] Dear Ms. Huang, My name is Mayra Prasad RN, Employee Care Manager for [employer], and I have been trying to reach you. The Employee Care  is a free-of-charge, confidential service provided to our employees and their family members covered by the "[employer]. Part of my job is to follow up with members who have recently been in the hospital or emergency room, to help them coordinate their care and answer questions they may have about their visit. I am able to provide assistance with medication questions, scheduling needed follow-up appointments, and arranging services like home health or home medical equipment. I can also provide education regarding your hospital or ER visit as well as your medical conditions. As healthcare providers, we know that patients do better when they have close follow up with a primary care provider (PCP), especially after a hospital or emergency department visit. If you do not have a PCP, I can help you find one that is convenient to you and covered by your insurance. I can also help you understand any after visit instructions, such as what symptoms to watch out for, or any new or changed medications. Employee Care Management now partners with [employer]. If you are a qualifying employee, you may receive an additional 10 wellness incentive points for every month of active participation with an Employee Care Manager. Remember that you can access your After Visit Summary by logging into your amazingtunes account. If you do not have a amazingtunes account, I can help you request access. Our program is designed to provide you with the opportunity to have a [employer] care manager partner with you for your healthcare needs. Please contact me at the below number if I can provide you with assistance for any of the above services.  
 
 
Sincerely, 
 
 Stephenie Regan RN, BSN  Employee Care Manager [email or web address]

## 2018-09-19 NOTE — PROGRESS NOTES
Second attempt to reach patient for [facility] Program, and discharge assessment. Discreet VM left. Will send UTR letter.

## 2018-10-18 ENCOUNTER — PATIENT OUTREACH (OUTPATIENT)
Dept: OTHER | Age: 38
End: 2018-10-18

## 2018-10-18 NOTE — LETTER
Ms. Henriquez Been [address] [address] [address] Dear Lela Stephenson, My name is Juarez Guerrero RN, Employee Care Manager for [employer], and I have been trying to reach you. The Employee Care Management program is a free-of-charge, confidential service provided to our employees and their family members covered by the [employer]. Part of my job is to follow up with members who have recently been in the hospital or emergency room, to help them coordinate their care and answer questions they may have about their visit. As healthcare providers, we know that patients do better when they have close follow up with a primary care provider (PCP), especially after a hospital or emergency department visit. If you do not have a PCP, I can help you find one that is convenient to you and covered by your insurance. I can also help you understand any after visit instructions, such as what symptoms to watch out for, or any new or changed medications. Remember that you can access your After Visit Summary by logging into your [employer] account. If you do not have a [employer] account, I can help you request access. Our program is designed to provide you with the opportunity to have a [employer] care manager partner with you for your healthcare needs. Please contact me at the below number if I can provide you with assistance for any of the above services. Sincerely, Juarez Guerrero RN, BSN  Employee Care Manager [email or web address]

## 2019-10-08 ENCOUNTER — APPOINTMENT (RX ONLY)
Dept: URBAN - METROPOLITAN AREA CLINIC 24 | Facility: CLINIC | Age: 39
Setting detail: DERMATOLOGY
End: 2019-10-08

## 2019-10-08 DIAGNOSIS — L30.2 CUTANEOUS AUTOSENSITIZATION: ICD-10-CM

## 2019-10-08 DIAGNOSIS — L259 CONTACT DERMATITIS AND OTHER ECZEMA, UNSPECIFIED CAUSE: ICD-10-CM

## 2019-10-08 PROBLEM — F32.9 MAJOR DEPRESSIVE DISORDER, SINGLE EPISODE, UNSPECIFIED: Status: ACTIVE | Noted: 2019-10-08

## 2019-10-08 PROBLEM — L30.8 OTHER SPECIFIED DERMATITIS: Status: ACTIVE | Noted: 2019-10-08

## 2019-10-08 PROBLEM — F41.9 ANXIETY DISORDER, UNSPECIFIED: Status: ACTIVE | Noted: 2019-10-08

## 2019-10-08 PROCEDURE — 99203 OFFICE O/P NEW LOW 30 MIN: CPT

## 2019-10-08 PROCEDURE — ? PRESCRIPTION

## 2019-10-08 PROCEDURE — ? COUNSELING

## 2019-10-08 PROCEDURE — ? OTHER

## 2019-10-08 RX ORDER — BETAMETHASONE DIPROPIONATE 0.5 MG/G
OINTMENT TOPICAL
Qty: 1 | Refills: 0 | Status: ERX | COMMUNITY
Start: 2019-10-08

## 2019-10-08 RX ORDER — BETAMETHASONE VALERATE 1 MG/G
CREAM TOPICAL
Qty: 2 | Refills: 1 | Status: ERX | COMMUNITY
Start: 2019-10-08

## 2019-10-08 RX ADMIN — BETAMETHASONE DIPROPIONATE: 0.5 OINTMENT TOPICAL at 00:00

## 2019-10-08 RX ADMIN — BETAMETHASONE VALERATE: 1 CREAM TOPICAL at 00:00

## 2019-10-08 ASSESSMENT — LOCATION ZONE DERM
LOCATION ZONE: TRUNK
LOCATION ZONE: LEG

## 2019-10-08 ASSESSMENT — LOCATION DETAILED DESCRIPTION DERM
LOCATION DETAILED: SUPERIOR THORACIC SPINE
LOCATION DETAILED: LEFT SUPERIOR MEDIAL MIDBACK
LOCATION DETAILED: RIGHT DISTAL PRETIBIAL REGION

## 2019-10-08 ASSESSMENT — LOCATION SIMPLE DESCRIPTION DERM
LOCATION SIMPLE: RIGHT PRETIBIAL REGION
LOCATION SIMPLE: UPPER BACK
LOCATION SIMPLE: LEFT LOWER BACK

## 2019-10-08 NOTE — PROCEDURE: OTHER
Note Text (......Xxx Chief Complaint.): This diagnosis correlates with the
Detail Level: Simple
Other (Free Text): If not responsive to topical steroid treatment, refer to allergist for patch testing

## 2019-10-08 NOTE — HPI: RASH
What Type Of Note Output Would You Prefer (Optional)?: Standard Output
How Severe Is Your Rash?: moderate
Is This A New Presentation, Or A Follow-Up?: Rash
Additional History: Pt had spider veins injected in March 2019. Pt can’t remember the name of the place or the doctor that did the injections or what they used to inject. Pt had veins injected in both legs but the rash is only on the right. Pt stated she had pain in her right knee that evening after the injections but no other issues until the rash appeared. Pt stated the rash appeared about 1 month after injections and it is painful to the touch.

## 2022-03-19 PROBLEM — Z37.9 NORMAL LABOR: Status: ACTIVE | Noted: 2017-04-24

## 2022-03-19 PROBLEM — O09.299 PRIOR PREGNANCY WITH FETAL DEMISE AND CURRENT PREGNANCY: Status: ACTIVE | Noted: 2017-04-24

## 2022-03-19 PROBLEM — Z98.890 STATUS POST HIP SURGERY: Status: ACTIVE | Noted: 2018-09-10

## 2023-05-16 NOTE — DISCHARGE INSTRUCTIONS
Vaginal Childbirth: Care Instructions  Your Care Instructions  Your body will slowly heal in the next few weeks. It is easy to get too tired and overwhelmed during the first weeks after your baby is born. Changes in your hormones can shift your mood without warning. You may find it hard to meet the extra demands on your energy and time. Take it easy on yourself. Follow-up care is a key part of your treatment and safety. Be sure to make and go to all appointments, and call your doctor if you are having problems. It's also a good idea to know your test results and keep a list of the medicines you take. How can you care for yourself at home? · Vaginal bleeding and cramps  ¨ After delivery, you will have a bloody discharge from the vagina. This will turn pink within a week and then white or yellow after about 10 days. It may last for 2 to 4 weeks or longer, until the uterus has healed. Use pads instead of tampons until you stop bleeding. ¨ Do not worry if you pass some blood clots, as long as they are smaller than a golf ball. If you have a tear or stitches in your vaginal area, change the pad at least every 4 hours to prevent soreness and infection. ¨ You may have cramps for the first few days after childbirth. These are normal and occur as the uterus shrinks to normal size. Take an over-the-counter pain medicine, such as acetaminophen (Tylenol), ibuprofen (Advil, Motrin), or naproxen (Aleve), for cramps. Read and follow all instructions on the label. Do not take aspirin, because it can cause more bleeding. ¨ Do not take two or more pain medicines at the same time unless the doctor told you to. Many pain medicines have acetaminophen, which is Tylenol. Too much acetaminophen (Tylenol) can be harmful. · Stitches  ¨ If you have stitches, they will dissolve on their own and do not need to be removed. Follow your doctor's instructions for cleaning the stitched area.   ¨ Put ice or a cold pack on your painful area for 10 to 20 minutes at a time, several times a day, for the first few days. Put a thin cloth between the ice and your skin. ¨ Sit in a few inches of warm water (sitz bath) 3 times a day and after bowel movements. The warm water helps with pain and itching. If you do not have a tub, a warm shower might help. · Breast fullness  ¨ Your breasts may overfill (engorge) in the first few days after delivery. To help milk flow and to relieve pain, warm your breasts in the shower or by using warm, moist towels before nursing. ¨ If you are not nursing, do not put warmth on your breasts or touch your breasts. Wear a tight bra or sports bra and use ice until the fullness goes away. This usually takes 2 to 3 days. ¨ Put ice or a cold pack on your breast after nursing to reduce swelling and pain. Put a thin cloth between the ice and your skin. · Activity  ¨ Eat a balanced diet. Do not try to lose weight by cutting calories. Keep taking your prenatal vitamins, or take a multivitamin. ¨ Get as much rest as you can. Try to take naps when your baby sleeps during the day. ¨ Get some exercise every day. But do not do any heavy exercise until your doctor says it is okay. ¨ Wait until you are healed (about 4 to 6 weeks) before you have sexual intercourse. Your doctor will tell you when it is okay to have sex. ¨ Talk to your doctor about birth control. You can get pregnant even before your period returns. Also, you can get pregnant while you are breastfeeding. · Mental health  ¨ It is normal to have some sadness, anxiety, sleeplessness, and mood swings after you go home. If you feel upset or hopeless for more than a few days or are having trouble doing the things you need to do, talk to your doctor. · Constipation and hemorrhoids  ¨ Drink plenty of fluids, enough so that your urine is light yellow or clear like water.  If you have kidney, heart, or liver disease and have to limit fluids, talk with your doctor before you increase the amount of fluids you drink. ¨ Eat plenty of fiber each day. Have a bran muffin or bran cereal for breakfast, and try eating a piece of fruit for a mid-afternoon snack. ¨ For painful, itchy hemorrhoids, put ice or a cold pack on the area several times a day for 10 minutes at a time. Follow this by putting a warm compress on the area for another 10 to 20 minutes or by sitting in a shallow, warm bath. When should you call for help? Call 911 anytime you think you may need emergency care. For example, call if:  · You are thinking of hurting yourself, your baby, or anyone else. · You have sudden, severe pain in your belly. · You passed out (lost consciousness). Call your doctor now or seek immediate medical care if:  · You have severe vaginal bleeding. · You are soaking through a pad each hour for 2 or more hours. · Your vaginal bleeding seems to be getting heavier or is still bright red 4 days after delivery. · You are dizzy or lightheaded, or you feel like you may faint. · You are vomiting or cannot keep fluids down. · You have a fever. · You have new or more belly pain. · You pass tissue (not just blood). · Your vaginal discharge smells bad. · Your belly feels tender or full and hard. · Your breasts are continuously painful or red. · You feel sad, anxious, or hopeless for more than a few days. Watch closely for changes in your health, and be sure to contact your doctor if you have any problems. Where can you learn more? Go to http://alanna-sally.info/. Enter Z302 in the search box to learn more about \"Vaginal Childbirth: Care Instructions. \"  Current as of: May 30, 2016  Content Version: 11.2  © 8275-8081 [employer]. Care instructions adapted under license by [employer] (which disclaims liability or warranty for this information).  If you have questions about a medical condition or this instruction, always ask your healthcare professional. [employer] disclaims any warranty or liability for your use of this information. Expiration Date (Optional): 08/24

## 2023-11-27 ENCOUNTER — OFFICE VISIT (OUTPATIENT)
Dept: ORTHOPEDIC SURGERY | Age: 43
End: 2023-11-27
Payer: COMMERCIAL

## 2023-11-27 VITALS — BODY MASS INDEX: 20.38 KG/M2 | WEIGHT: 115 LBS | HEIGHT: 63 IN

## 2023-11-27 DIAGNOSIS — M25.551 RIGHT HIP PAIN: Primary | ICD-10-CM

## 2023-11-27 PROCEDURE — 99203 OFFICE O/P NEW LOW 30 MIN: CPT | Performed by: ORTHOPAEDIC SURGERY

## 2023-11-27 NOTE — PROGRESS NOTES
Name: Lori Coleman  YOB: 1980  Gender: female  MRN: [id number]      CC: Hip Pain (R)       HPI: Lori Coleman is a 44 y.o. female who presents with Hip Pain (R)  . She is a referral from Dr Tasia Watson who fixed her left hip back in 2018. She reports that in July of 2023 she tore her L1. It is still in the process of healing, but now her right sacrum has been hurting along with her hip. She reports her  back is getting better but her hip is getting worse. Most of her pain is located along the psoas and into her groin. External rotation and sitting for long periods of time bother her the most. She hasn't been cleared to run for months and even walking has been bothering. Says she would like to limp but forces herself not to. No previous injury to right hip. No formal physical therapy. ROS/Meds/PSH/PMH/FH/SH: I personally reviewed the patients standard intake form. Below are the pertinents    Tobacco:  reports that she has never smoked. She has never used smokeless tobacco.  Diabetes: none  Other: none    Physical Examination:  General: no acute distress  Lungs: breathing easily  CV: regular rhythm by pulse  Right Hip: Pain with logroll internal/external rotation some diffuse tenderness palpation around the peritrochanteric region of the piriformis. Some pain with impingement testing with FADIR and mild pain with TYLER. Mild discomfort with Stinchfield. Negative straight leg raise. Motor and sensory intact. Imaging:   Hip/pelvis XR: RUDY 4 views     Clinical Indication  1. 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 No advanced degenerative changes. Impression: RUDY as above, no acute findings            All imaging interpreted by myself Steven Valle MD independent of radiology

## 2023-12-06 ENCOUNTER — HOSPITAL ENCOUNTER (OUTPATIENT)
Dept: PHYSICAL THERAPY | Age: 43
Setting detail: RECURRING SERIES
End: 2023-12-06
Payer: COMMERCIAL

## 2023-12-06 NOTE — PROGRESS NOTES
Sona Wisdom  : 1980  Primary: [employer]  Secondary:  [facility] [address] [address] [facility] [address]  [phone]   [phone]       PHYSICAL THERAPY    Patient canceled Eval appointment today do to sick child and was rescheduled next week.    Shola Stratton, PT

## 2023-12-13 ENCOUNTER — HOSPITAL ENCOUNTER (OUTPATIENT)
Dept: PHYSICAL THERAPY | Age: 43
Setting detail: RECURRING SERIES
Discharge: HOME | End: 2023-12-16
Payer: COMMERCIAL

## 2023-12-13 DIAGNOSIS — M25.551 PAIN IN RIGHT HIP: Primary | ICD-10-CM

## 2023-12-13 PROCEDURE — 97161 PT EVAL LOW COMPLEX 20 MIN: CPT

## 2023-12-13 PROCEDURE — 97110 THERAPEUTIC EXERCISES: CPT

## 2023-12-13 ASSESSMENT — PAIN SCALES - GENERAL: PAINLEVEL_OUTOF10: 6

## 2023-12-13 NOTE — THERAPY EVALUATION
Mary Baird  : 1980  Primary: [employer]  Secondary:  [address] @ [address] [place] [address] [address] [facility]  Phone: [phone]  Fax: [phone] Plan Frequency: 2 x a week for 4-6 weeks    Plan of Care/Certification Expiration Date: 24        Plan of Care/Certification Expiration Date:  Plan of Care/Certification Expiration Date: 24    Frequency/Duration: Plan Frequency: 2 x a week for 4-6 weeks      Time In/Out:   Time In: 0720  Time Out: 1100    PT Visit Info:    Plan Frequency: 2 x a week for 4-6 weeks      Visit Count:  1                OUTPATIENT PHYSICAL THERAPY:             Initial Assessment 2023                 Episode (Right hip pain)         Treatment Diagnosis:     Pain in right hip  Medical/Referring Diagnosis:    Right hip pain [M25.551]    Referring Physician:  Kajal Stephens MD MD Orders:  PT Eval and Treat   Return MD Appt:  TBD  Date of Onset:  Onset Date:  (2023)     Allergies:  Amoxicillin and Cefaclor  Restrictions/Precautions:    None      Medications Last Reviewed:  2023     SUBJECTIVE   History of Injury/Illness (Reason for Referral):  Patient reports injuring her neck and low back in 2023 moving furniture and doing work around there home. Since that time, she has had increased right hip pain limiting exercise activity, gait, sitting, and sit to stand. Patient Stated Goal(s):  \"return to normal exercise\"  Initial Pain Level:     610   Post Session Pain Level:      /10  Past Medical History/Comorbidities:   Ms. Teo Mendez  has no past medical history on file. Ms. Teo Mendez  has no past surgical history on file.   Social History/Living Environment:   PT/OT Social History/Living Environment: Patient lives with their family  Type of Home: House: Two Story    Level of Function/Work/Activity:    PT/OT Level of Function/Work/Activity: Prior Level of Function: Independent

## 2023-12-13 NOTE — PROGRESS NOTES
6 min     Bike      Nu step      L stretch      Calf stretch      SKTC [id number]     HSS [id number]     Piriformis stretch [id number]     IT Band stretch [id number]     SI self mob [id number]                                 MANUAL THERAPY: (0 minutes):   Joint mobilization and Soft tissue mobilization was utilized and necessary because of the patient's restricted joint motion, painful spasm, loss of articular motion, and restricted motion of soft tissue. Date  12/13/2023    Technique Used Grade  Level # Time(s) Effect while being performed                                                      MODALITIES:       *  Vasopneumatic Therapy utilizing the Game Ready system in order to provide analgesia, relieve muscle spasm, and reduce inflammation and edema. Body Part:    Edema Measurements: Involved - Pre:  cm , Post:  cm    Uninvolved -   cm   Effects of Edema on Function:    Comments:                   *  Cold Pack Therapy in order to provide analgesia and reduce inflammation and edema. *  Hot Pack Therapy in order to provide analgesia and relieve muscle spasm. Treatment/Session Summary:    Treatment Assessment:   See Eval  Communication/Consultation:  Therapy Evaluation sent to referring provider  Equipment provided today:  HEP  Recommendations/Intent for next treatment session: Next visit will focus on ROM and hip strength.     >Total Treatment Billable Duration:  15 minutes   Time In: 1015  Time Out: [facility], PT         Charge Capture  [employer] Portal  Appt Desk     Future Appointments   Date Time Provider [address]   12/18/2023 10:15 AM Irven Oppenheim, PT [facility]   12/27/2023 11:15 AM Irven Oppenheim, PT [facility]   1/4/2024  9:30 AM Irven Oppenheim, PT [facility]   1/8/2024  9:30 AM Irven Oppenheim, PT [facility]   1/15/2024 10:45 AM Dionna Feast, PT [facility]   1/16/2024  9:30 AM Irven Oppenheim, PT [facility]   1/22/2024  9:30 AM Irven Oppenheim, PT

## 2023-12-27 ENCOUNTER — HOSPITAL ENCOUNTER (OUTPATIENT)
Dept: PHYSICAL THERAPY | Age: 43
Setting detail: RECURRING SERIES
Discharge: HOME | End: 2023-12-30
Payer: COMMERCIAL

## 2023-12-27 PROCEDURE — 97110 THERAPEUTIC EXERCISES: CPT

## 2023-12-27 NOTE — PROGRESS NOTES
[facility]  : 1980  Primary: [employer]  Secondary:  [address] @ [address] [place] [address] [address] [address]  Phone: [phone]  Fax: [phone] Plan Frequency: 2 x a week for 4-6 weeks  Plan of Care/Certification Expiration Date: 24        Plan of Care/Certification Expiration Date:  Plan of Care/Certification Expiration Date: 24    Frequency/Duration: Plan Frequency: 2 x a week for 4-6 weeks      Time In/Out:   Time In: 1110  Time Out: 200    PT Visit Info:    Plan Frequency: 2 x a week for 4-6 weeks      Visit Count:  3    OUTPATIENT PHYSICAL THERAPY:   Treatment Note 2023       Charge Capture   Episode  (Right hip pain)               Treatment Diagnosis:     Right hip pain [M25.551]   Medical/Referring Diagnosis:       Right hip pain [M25.551]   Referring Physician:  Reinaldo Chester MD MD Orders:  PT Eval and Treat   Return MD Appt:  TBD   Date of Onset:  Onset Date:  (2023)     Allergies:   Amoxicillin and Cefaclor  Restrictions/Precautions:   None      Interventions Planned (Treatment may consist of any combination of the following):     See Assessment Note      Subjective Comments:    Pt reports some increased soreness with missing a couple of days stretching during Stella. Initial Pain Level[de-identified]    soreness  /10  Post Session Pain Level:    soreness    /10  Medications Last Reviewed:  2023  Updated Objective Findings:   Anterior rotation right innominate  Treatment   THERAPEUTIC EXERCISE: (40 minutes):    Exercises per grid below to improve mobility, strength, balance, coordination, and dynamic movement of generalized knee to improve functional bending, lifting, and carrying. Required minimal visual, verbal, and manual cues to promote proper body alignment, promote proper body posture, and promote proper body mechanics.   Progressed resistance, range, repetitions, and complexity of

## 2024-01-04 ENCOUNTER — HOSPITAL ENCOUNTER (OUTPATIENT)
Dept: PHYSICAL THERAPY | Age: 44
Setting detail: RECURRING SERIES
Discharge: HOME | End: 2024-01-07
Payer: COMMERCIAL

## 2024-01-04 PROCEDURE — 97110 THERAPEUTIC EXERCISES: CPT

## 2024-01-04 NOTE — PROGRESS NOTES
Sona Wisdom  : 1980  Primary: [employer]  Secondary:  [facility] @ [place] [address] [place] [address]  Phone: [phone]  Fax: [phone] Plan Frequency: 2 x a week for 4-6 weeks  Plan of Care/Certification Expiration Date: 24        Plan of Care/Certification Expiration Date:  Plan of Care/Certification Expiration Date: 24    Frequency/Duration: Plan Frequency: 2 x a week for 4-6 weeks      Time In/Out:   Time In: 0935  Time Out: 1020    PT Visit Info:    Plan Frequency: 2 x a week for 4-6 weeks      Visit Count:  4    OUTPATIENT PHYSICAL THERAPY:   Treatment Note 2024       Charge Capture   Episode  (Right hip pain)               Treatment Diagnosis:     Right hip pain [M25.551]   Medical/Referring Diagnosis:       Right hip pain [M25.551]   Referring Physician:  Steven Rogers MD MD Orders:  PT Eval and Treat   Return MD Appt:  TBD   Date of Onset:  Onset Date:  (2023)     Allergies:   Amoxicillin and Cefaclor  Restrictions/Precautions:   None      Interventions Planned (Treatment may consist of any combination of the following):     See Assessment Note      Subjective Comments:    Pt reports that she feels better for 2 days after being here then symptoms return despite doing stretches  Initial Pain Level::    soreness  /10  Post Session Pain Level:    soreness    /10  Medications Last Reviewed:  2024  Updated Objective Findings:   Anterior rotation right innominate  Treatment   THERAPEUTIC EXERCISE: (40 minutes):    Exercises per grid below to improve mobility, strength, balance, coordination, and dynamic movement of generalized knee to improve functional bending, lifting, and carrying.  Required minimal visual, verbal, and manual cues to promote proper body alignment, promote proper body posture, and promote proper body mechanics.  Progressed resistance, range, repetitions, and complexity

## 2024-01-08 ENCOUNTER — HOSPITAL ENCOUNTER (OUTPATIENT)
Dept: PHYSICAL THERAPY | Age: 44
Setting detail: RECURRING SERIES
Discharge: HOME | End: 2024-01-11
Payer: COMMERCIAL

## 2024-01-08 PROCEDURE — 97110 THERAPEUTIC EXERCISES: CPT

## 2024-01-08 NOTE — PROGRESS NOTES
Sona Wisdom  : 1980  Primary: [employer]  Secondary:  [facility] @ [place] [address] [place] [address]  Phone: [phone]  Fax: [phone] Plan Frequency: 2 x a week for 4-6 weeks  Plan of Care/Certification Expiration Date: 24        Plan of Care/Certification Expiration Date:  Plan of Care/Certification Expiration Date: 24    Frequency/Duration: Plan Frequency: 2 x a week for 4-6 weeks      Time In/Out:   Time In: 0930  Time Out: 1015    PT Visit Info:    Plan Frequency: 2 x a week for 4-6 weeks      Visit Count:  5    OUTPATIENT PHYSICAL THERAPY:   Treatment Note 2024       Charge Capture   Episode  (Right hip pain)               Treatment Diagnosis:     Right hip pain [M25.551]   Medical/Referring Diagnosis:       Right hip pain [M25.551]   Referring Physician:  Steven Rogers MD MD Orders:  PT Eval and Treat   Return MD Appt:  TBD   Date of Onset:  Onset Date:  (2023)     Allergies:   Amoxicillin and Cefaclor  Restrictions/Precautions:   None      Interventions Planned (Treatment may consist of any combination of the following):     See Assessment Note      Subjective Comments:    Pt reports that changing to lunge stretch has helped, right > left glute soreness after last treatment  Initial Pain Level::    soreness  /10  Post Session Pain Level:    soreness    /10  Medications Last Reviewed:  2024  Updated Objective Findings:   = innominate  Treatment   THERAPEUTIC EXERCISE: (40 minutes):    Exercises per grid below to improve mobility, strength, balance, coordination, and dynamic movement of generalized knee to improve functional bending, lifting, and carrying.  Required minimal visual, verbal, and manual cues to promote proper body alignment, promote proper body posture, and promote proper body mechanics.  Progressed resistance, range, repetitions, and complexity of movement as

## 2024-01-16 ENCOUNTER — TELEPHONE (OUTPATIENT)
Dept: ORTHOPEDIC SURGERY | Age: 44
End: 2024-01-16

## 2024-01-16 ENCOUNTER — HOSPITAL ENCOUNTER (OUTPATIENT)
Dept: PHYSICAL THERAPY | Age: 44
Setting detail: RECURRING SERIES
Discharge: HOME | End: 2024-01-19
Payer: COMMERCIAL

## 2024-01-16 DIAGNOSIS — M25.551 RIGHT HIP PAIN: Primary | ICD-10-CM

## 2024-01-16 PROCEDURE — 97110 THERAPEUTIC EXERCISES: CPT

## 2024-01-16 NOTE — PROGRESS NOTES
Sona Wisdom  : 1980  Primary: [employer]  Secondary:  [facility] @ [place] [address] [place] [address]  Phone: [phone]  Fax: [phone] Plan Frequency: 2 x a week for 4-6 weeks  Plan of Care/Certification Expiration Date: 24        Plan of Care/Certification Expiration Date:  Plan of Care/Certification Expiration Date: 24    Frequency/Duration: Plan Frequency: 2 x a week for 4-6 weeks      Time In/Out:   Time In: 0930  Time Out: 1000    PT Visit Info:    Plan Frequency: 2 x a week for 4-6 weeks      Visit Count:  6    OUTPATIENT PHYSICAL THERAPY:   Treatment Note 2024       Charge Capture   Episode  (Right hip pain)               Treatment Diagnosis:     Right hip pain [M25.551]   Medical/Referring Diagnosis:       Right hip pain [M25.551]   Referring Physician:  Steven Rogers MD MD Orders:  PT Eval and Treat   Return MD Appt:  TBD   Date of Onset:  Onset Date:  (2023)     Allergies:   Amoxicillin and Cefaclor  Restrictions/Precautions:   None      Interventions Planned (Treatment may consist of any combination of the following):     See Assessment Note      Subjective Comments:    Pt reports significant right anterior hip pain starting yesterday with difficulty WB and standing up straight  Initial Pain Level::    7  /10  Post Session Pain Level:    5    /10  Medications Last Reviewed:  2024  Updated Objective Findings:   anterior rotation right innominate  Treatment   THERAPEUTIC EXERCISE: (28 minutes):    Exercises per grid below to improve mobility, strength, balance, coordination, and dynamic movement of generalized knee to improve functional bending, lifting, and carrying.  Required minimal visual, verbal, and manual cues to promote proper body alignment, promote proper body posture, and promote proper body mechanics.  Progressed resistance, range, repetitions, and complexity of

## 2024-01-16 NOTE — TELEPHONE ENCOUNTER
----- Message from Any Foster sent at 3/14/2018  8:29 AM CDT -----  Contact: self  Patient requesting a mammogram. Please contact her at [phone].     Pt called and asked for a mri order for her right hip

## 2024-01-22 ENCOUNTER — HOSPITAL ENCOUNTER (OUTPATIENT)
Dept: PHYSICAL THERAPY | Age: 44
Setting detail: RECURRING SERIES
End: 2024-01-22
Payer: COMMERCIAL

## 2024-01-22 NOTE — PROGRESS NOTES
Sona Wisdom  : 1980  Primary: [employer]  Secondary:  [facility] [place] [address] [facility] [address]  [phone]   [phone]       PHYSICAL THERAPY    Patient canceled appointment today do to waiting for MRI results and was reminded of next scheduled a visit.    Shola Stratton, PT

## 2024-01-29 ENCOUNTER — HOSPITAL ENCOUNTER (OUTPATIENT)
Dept: PHYSICAL THERAPY | Age: 44
Setting detail: RECURRING SERIES
End: 2024-01-29
Payer: COMMERCIAL

## 2024-01-29 NOTE — PROGRESS NOTES
Sona Wisdom  : 1980  Primary: [employer]  Secondary:  [facility] [place] [address] [facility] [address]  [phone]   [phone]       PHYSICAL THERAPY    Patient canceled appointment today do to conflicting appt for her daughter and rescheduled visit.    Shola Stratton, PT

## 2024-02-01 ENCOUNTER — HOSPITAL ENCOUNTER (OUTPATIENT)
Dept: PHYSICAL THERAPY | Age: 44
Setting detail: RECURRING SERIES
Discharge: HOME | End: 2024-02-04
Payer: COMMERCIAL

## 2024-02-01 PROCEDURE — 97110 THERAPEUTIC EXERCISES: CPT

## 2024-02-01 NOTE — PROGRESS NOTES
Sona Wisdom  : 1980  Primary: [employer]  Secondary:  [facility] @ [place] [address] [place] [address]  Phone: [phone]  Fax: [phone] Plan Frequency: 2 x a week for 4-6 weeks  Plan of Care/Certification Expiration Date: 24        Plan of Care/Certification Expiration Date:  Plan of Care/Certification Expiration Date: 24    Frequency/Duration: Plan Frequency: 2 x a week for 4-6 weeks      Time In/Out:   Time In: 1115  Time Out: 1145    PT Visit Info:    Plan Frequency: 2 x a week for 4-6 weeks      Visit Count:  7    OUTPATIENT PHYSICAL THERAPY:   Treatment Note 2024       Charge Capture   Episode  (Right hip pain)               Treatment Diagnosis:     Right hip pain [M25.551]   Medical/Referring Diagnosis:       Right hip pain [M25.551]   Referring Physician:  Steven Rogers MD MD Orders:  PT Eval and Treat   Return MD Appt:  TBD   Date of Onset:  Onset Date:  (2023)     Allergies:   Amoxicillin and Cefaclor  Restrictions/Precautions:   None      Interventions Planned (Treatment may consist of any combination of the following):     See Assessment Note      Subjective Comments:    Pt reports decreased right hip pain but still feels impingement pain, MRI shows inflammation  but unclear if labral tear  Initial Pain Level::    4  /10  Post Session Pain Level:    3    /10  Medications Last Reviewed:  2024  Updated Objective Findings:   anterior rotation right innominate  Treatment   THERAPEUTIC EXERCISE: (28 minutes):    Exercises per grid below to improve mobility, strength, balance, coordination, and dynamic movement of generalized knee to improve functional bending, lifting, and carrying.  Required minimal visual, verbal, and manual cues to promote proper body alignment, promote proper body posture, and promote proper body mechanics.  Progressed resistance, range, repetitions, and complexity

## 2024-02-02 NOTE — PROGRESS NOTES
do think she has extra-articular symptoms as well specifically her adductor contribution as well as peritrochanteric symptoms.  We discussed continued physical therapy, hip arthroscopy, as well as an injection.  She had a steroid flare with the previous injection with Dr. Barnett years ago.  We discussed possibility of an intra-articular local anesthetic injection for diagnostic purposes.  She elected to proceed with that today.    After verbal informed consent the Right hip was injected intra-articularly under ultrasound guidance with the curvilinear probe n the longitudinal axis from an anterolateral approach.  3 cc of 1% lidocaine was used in the skin down to the capsule until the capsule was seen to clearly distend with the needle in the femoral head neck junction off the articular surface.  We then injected 3 cc of 0.25% Marcaine  intra-articularly the capsule was seen to clearly distend.  The images were saved to the ultrasound machine.  The patient tolerated the procedure well and was given postinjection flare precautions.     Patient was reexamined after about 15 minutes and she had near complete resolution of her deep hip and groin pain.  She was able to tolerate FADIR and TYLER testing without significant discomfort.  Based on the results to the intra-articular injection I do think this confirms she likely has a labral tear and the RUDY is responsible for a lot of her symptoms.  She feels she has failed extensive conservative management with physical therapy and time and she has done well from a left hip arthroscopy.  She wants to consider right hip arthroscopy with cam interim osteoplasty and likely labral repair.  I think this is reasonable.  She will go home and think about her options but likely wants to proceed with surgery scheduling.  I can see her back for preoperative appointment if she would like to proceed.            Steven Rogers MD, FAAOS  Orthopaedics and Sports Medicine

## 2024-02-05 ENCOUNTER — OFFICE VISIT (OUTPATIENT)
Dept: ORTHOPEDIC SURGERY | Age: 44
End: 2024-02-05
Payer: COMMERCIAL

## 2024-02-05 DIAGNOSIS — M25.859 FEMORAL ACETABULAR IMPINGEMENT: Primary | ICD-10-CM

## 2024-02-05 DIAGNOSIS — S73.191A TEAR OF RIGHT ACETABULAR LABRUM, INITIAL ENCOUNTER: ICD-10-CM

## 2024-02-05 DIAGNOSIS — M25.551 RIGHT HIP PAIN: ICD-10-CM

## 2024-02-05 PROCEDURE — 99214 OFFICE O/P EST MOD 30 MIN: CPT | Performed by: ORTHOPAEDIC SURGERY

## 2024-02-07 DIAGNOSIS — M25.859 FEMORAL ACETABULAR IMPINGEMENT: Primary | ICD-10-CM

## 2024-02-07 DIAGNOSIS — M25.551 RIGHT HIP PAIN: ICD-10-CM

## 2024-02-07 DIAGNOSIS — S73.191A TEAR OF RIGHT ACETABULAR LABRUM, INITIAL ENCOUNTER: ICD-10-CM

## 2024-02-08 DIAGNOSIS — M25.859 FEMORAL ACETABULAR IMPINGEMENT: ICD-10-CM

## 2024-02-08 DIAGNOSIS — S73.191A TEAR OF RIGHT ACETABULAR LABRUM, INITIAL ENCOUNTER: Primary | ICD-10-CM

## 2024-02-08 DIAGNOSIS — M25.551 RIGHT HIP PAIN: ICD-10-CM

## 2024-02-15 ENCOUNTER — HOSPITAL ENCOUNTER (OUTPATIENT)
Dept: PHYSICAL THERAPY | Age: 44
Setting detail: RECURRING SERIES
Discharge: HOME | End: 2024-02-18
Payer: COMMERCIAL

## 2024-02-15 PROCEDURE — 97110 THERAPEUTIC EXERCISES: CPT

## 2024-02-15 NOTE — PROGRESS NOTES
Sona Webster [employer]  : 1980  Primary: [employer]  Secondary:  [facility] @ [place] [address] [place] [address]  Phone: [phone]  Fax: [phone] Plan Frequency: 2 x a week for 4-6 weeks  Plan of Care/Certification Expiration Date: 24        Plan of Care/Certification Expiration Date:  Plan of Care/Certification Expiration Date: 24    Frequency/Duration: Plan Frequency: 2 x a week for 4-6 weeks      Time In/Out:   Time In: 1115  Time Out: 1200    PT Visit Info:    Plan Frequency: 2 x a week for 4-6 weeks      Visit Count:  8    OUTPATIENT PHYSICAL THERAPY:   Treatment Note 2/15/2024       Charge Capture   Episode  (Right hip pain)               Treatment Diagnosis:     Right hip pain [M25.551]   Medical/Referring Diagnosis:       Right hip pain [M25.551]   Referring Physician:  Steven Rogers MD MD Orders:  PT Eval and Treat   Return MD Appt:  TBD   Date of Onset:  Onset Date:  (2023)     Allergies:   Amoxicillin and Cefaclor  Restrictions/Precautions:   None      Interventions Planned (Treatment may consist of any combination of the following):     See Assessment Note      Subjective Comments:    Pt reports some improvement after injection, will be proceeding with right hip surgery for labral repair  Initial Pain Level::    3  /10  Post Session Pain Level:    2    /10  Medications Last Reviewed:  2/15/2024  Updated Objective Findings:   anterior rotation right innominate  Treatment   THERAPEUTIC EXERCISE: (38 minutes):    Exercises per grid below to improve mobility, strength, balance, coordination, and dynamic movement of generalized knee to improve functional bending, lifting, and carrying.  Required minimal visual, verbal, and manual cues to promote proper body alignment, promote proper body posture, and promote proper body mechanics.  Progressed resistance, range, repetitions, and complexity of movement

## 2024-02-15 NOTE — THERAPY DISCHARGE
Sona Wisdom  : 1980  Primary: [employer]  Secondary:  [facility] @ [place] [address] [place] [address]  Phone: [phone]  Fax: [phone] Plan Frequency: 2 x a week for 4-6 weeks    Plan of Care/Certification Expiration Date: 24        Plan of Care/Certification Expiration Date:  Plan of Care/Certification Expiration Date: 24    Frequency/Duration: Plan Frequency: 2 x a week for 4-6 weeks      Time In/Out:   Time In: 1115  Time Out: 1200    PT Visit Info:    Plan Frequency: 2 x a week for 4-6 weeks      Visit Count:  8                OUTPATIENT PHYSICAL THERAPY:             Discharge Summary 2/15/2024                 Episode (Right hip pain)         Treatment Diagnosis:     No data found  Medical/Referring Diagnosis:    No admission diagnoses are documented for this encounter.    Referring Physician:  Steven Rogers MD MD Orders:  PT Eval and Treat   Return MD Appt:  TBD  Date of Onset:  Onset Date:  (2023)     Allergies:  Amoxicillin and Cefaclor  Restrictions/Precautions:    None      Medications Last Reviewed:  2/15/2024     SUBJECTIVE   History of Injury/Illness (Reason for Referral):  Patient reports injuring her neck and low back in 2023 moving furniture and doing work around there home. Since that time, she has had increased right hip pain limiting exercise activity, gait, sitting, and sit to stand.  Patient Stated Goal(s):  \"return to normal exercise\"  Initial Pain Level:      /10   Post Session Pain Level:      /10  Past Medical History/Comorbidities:   Ms. Wisdom  has no past medical history on file.  Ms. Wisdom  has no past surgical history on file.  Social History/Living Environment:   PT/OT Social History/Living Environment: Patient lives with their family  Type of Home: House: Two Story    Level of Function/Work/Activity:    PT/OT Level of Function/Work/Activity: Prior Level

## 2024-02-22 ENCOUNTER — OFFICE VISIT (OUTPATIENT)
Dept: ORTHOPEDIC SURGERY | Age: 44
End: 2024-02-22

## 2024-02-22 DIAGNOSIS — S73.191A TEAR OF RIGHT ACETABULAR LABRUM, INITIAL ENCOUNTER: ICD-10-CM

## 2024-02-22 DIAGNOSIS — M25.859 FEMORAL ACETABULAR IMPINGEMENT: Primary | ICD-10-CM

## 2024-02-22 NOTE — H&P (VIEW-ONLY)
Name: Sona Wisdom  YOB: 1980  Gender: female  MRN: [id number]      CC: Hip Pain (R)       HPI: Sona Wisdom is a 43 y.o. female who returns for her right hip preoperative appointment.  She had excellent relief of her symptoms with 100% pain relief for at least a few hours after the intra-articular hip injection of local anesthetic.  She wants to proceed with surgical intervention.        Physical Examination:  General: no acute distress  HEENT NCAT  Lungs: breathing easily CTAB  CV: regular rate and rhythm  Abd: soft   Right Hip: Pain with FADIR and TYLER.  Pain with Stinchfield.  Good resisted strength        Assessment:     ICD-10-CM    1. Femoral acetabular impingement  M25.859 Ambulatory referral to Physical Therapy     CANCELED: Amb External Referral To Physical Therapy      2. Tear of right acetabular labrum, initial encounter  S73.191A           Plan:   We discussed the details risks and benefits of hip arthroscopy to include cam and rim osteoplasty and labral repair versus debridement.  We reviewed the risk of surgery including but not limited to anesthetic complications, infection, postoperative DVT/PE, postoperative stiffness, lateral femoral cutaneous nerve palsy, pudendal nerve palsy, heterotopic ossification as well as continued hip pain and possible need for further surgery in the future. We also reviewed the postoperative course including extensive physical therapy and a likely 5 to 6-month full recovery period.  The patient expressed understanding and elected to proceed as planned, informed consent was obtained.             Steven Rogers MD, FAAOS  Orthopaedics and Sports Medicine

## 2024-02-22 NOTE — PROGRESS NOTES
Name: Sona Wisdom  YOB: 1980  Gender: female  MRN: [id number]      CC: Hip Pain (R)       HPI: Sona Wisdom is a 43 y.o. female who returns for her right hip preoperative appointment.  She had excellent relief of her symptoms with 100% pain relief for at least a few hours after the intra-articular hip injection of local anesthetic.  She wants to proceed with surgical intervention.        Physical Examination:  General: no acute distress  HEENT NCAT  Lungs: breathing easily CTAB  CV: regular rate and rhythm  Abd: soft   Right Hip: Pain with FADIR and TYLER.  Pain with Stinchfield.  Good resisted strength        Assessment:     ICD-10-CM    1. Femoral acetabular impingement  M25.859 Ambulatory referral to Physical Therapy     CANCELED: Amb External Referral To Physical Therapy      2. Tear of right acetabular labrum, initial encounter  S73.191A           Plan:   We discussed the details risks and benefits of hip arthroscopy to include cam and rim osteoplasty and labral repair versus debridement.  We reviewed the risk of surgery including but not limited to anesthetic complications, infection, postoperative DVT/PE, postoperative stiffness, lateral femoral cutaneous nerve palsy, pudendal nerve palsy, heterotopic ossification as well as continued hip pain and possible need for further surgery in the future. We also reviewed the postoperative course including extensive physical therapy and a likely 5 to 6-month full recovery period.  The patient expressed understanding and elected to proceed as planned, informed consent was obtained.             Steven Rogers MD, FAAOS  Orthopaedics and Sports Medicine

## 2024-02-23 ENCOUNTER — TELEPHONE (OUTPATIENT)
Dept: ORTHOPEDIC SURGERY | Age: 44
End: 2024-02-23

## 2024-02-23 NOTE — TELEPHONE ENCOUNTER
She is having surgery next week and wants to know if she should get a handicap sticker. Please give her a call.

## 2024-02-26 DIAGNOSIS — S73.191A TEAR OF RIGHT ACETABULAR LABRUM, INITIAL ENCOUNTER: ICD-10-CM

## 2024-02-26 DIAGNOSIS — M25.859 FEMORAL ACETABULAR IMPINGEMENT: Primary | ICD-10-CM

## 2024-02-26 RX ORDER — OXYCODONE HYDROCHLORIDE 5 MG/1
5 TABLET ORAL EVERY 4 HOURS PRN
Qty: 30 TABLET | Refills: 0 | Status: SHIPPED | OUTPATIENT
Start: 2024-02-26 | End: 2024-03-02

## 2024-02-26 NOTE — PERIOP NOTE
Patient verified name and .  Order for consent not found in EHR and matches case posting; patient verifies procedure.   Type 2 surgery, phone assessment complete.  Orders not received.  Labs per surgeon: none  Labs per anesthesia protocol: HGB (DOS), HCG (DOS)    Patient answered medical/surgical history questions at their best of ability. All prior to admission medications documented in EPIC.  Patient instructed to take the following medications the day of surgery according to anesthesia guidelines with a small sip of water: NONE On the day before surgery please take 2 Tylenol in the morning and then again before bed. You may use either regular or extra strength.   Hold all vitamins 7 days prior to surgery and NSAIDS 5 days prior to surgery. Prescription meds to hold: NONE  Patient instructed on the following:    > Arrive at [facility] Entrance, time of arrival to be called the day before by 1700  > NPO after midnight, unless otherwise indicated, including gum, mints, and ice chips  > Responsible adult must drive patient to the hospital, stay during surgery, and patient will need supervision 24 hours after anesthesia  > Use non moisturizing soap in shower the night before surgery and on the morning of surgery  > All piercings must be removed prior to arrival.    > Leave all valuables (money and jewelry) at home but bring insurance card and ID on DOS.   > You may be required to pay a deductible or co-pay on the day of your procedure. You can pre-pay by calling [phone] if your surgery is at the [facility] or [phone] if your surgery is at the [facility].  > Do not wear make-up, nail polish, lotions, cologne, perfumes, powders, or oil on skin. Artificial nails are not permitted.

## 2024-02-27 NOTE — PERIOP NOTE
Preop department called to notify patient of arrival time for scheduled procedure. Instructions given to   - Arrive at [facility] Entrance [address].  - Remain NPO after midnight, unless otherwise indicated, including gum, mints, and ice chips.   - Have a responsible adult to drive patient to the hospital, stay during surgery, and patient will need supervision 24 hours after anesthesia.   - Use antibacterial soap in shower the night before surgery and on the morning of surgery.       Was patient contacted: yes  Voicemail left:   Numbers contacted: [phone]   Arrival time: 0900

## 2024-02-27 NOTE — PERIOP NOTE
Preop department called to notify patient of arrival time for scheduled procedure. Instructions given to   - Arrive at [facility] Entrance [address].  - Remain NPO after midnight, unless otherwise indicated, including gum, mints, and ice chips.   - Have a responsible adult to drive patient to the hospital, stay during surgery, and patient will need supervision 24 hours after anesthesia.   - Use antibacterial soap in shower the night before surgery and on the morning of surgery.       Was patient contacted: Yes, notified of new arrival time  Voicemail left:   Numbers contacted: [phone]   Arrival time: 0715

## 2024-02-27 NOTE — PERIOP NOTE
Preop department called to notify patient of arrival time for scheduled procedure. Instructions given to   - Arrive at [facility] Entrance [address].  - Remain NPO after midnight, unless otherwise indicated, including gum, mints, and ice chips.   - Have a responsible adult to drive patient to the hospital, stay during surgery, and patient will need supervision 24 hours after anesthesia.   - Use antibacterial soap in shower the night before surgery and on the morning of surgery.       Was patient contacted: yes, notified of new arrival time  Voicemail left:   Numbers contacted: [phone]   Arrival time: 0800

## 2024-02-28 ENCOUNTER — ANESTHESIA (OUTPATIENT)
Dept: SURGERY | Age: 44
End: 2024-02-28
Payer: COMMERCIAL

## 2024-02-28 ENCOUNTER — HOSPITAL ENCOUNTER (OUTPATIENT)
Age: 44
Setting detail: OUTPATIENT SURGERY
Discharge: HOME | End: 2024-02-28
Attending: ORTHOPAEDIC SURGERY | Admitting: ORTHOPAEDIC SURGERY
Payer: COMMERCIAL

## 2024-02-28 ENCOUNTER — ANESTHESIA EVENT (OUTPATIENT)
Dept: SURGERY | Age: 44
End: 2024-02-28
Payer: COMMERCIAL

## 2024-02-28 ENCOUNTER — APPOINTMENT (OUTPATIENT)
Dept: GENERAL RADIOLOGY | Age: 44
End: 2024-02-28
Attending: ORTHOPAEDIC SURGERY
Payer: COMMERCIAL

## 2024-02-28 VITALS
TEMPERATURE: 97.5 F | RESPIRATION RATE: 17 BRPM | WEIGHT: 115 LBS | SYSTOLIC BLOOD PRESSURE: 104 MMHG | OXYGEN SATURATION: 98 % | HEART RATE: 49 BPM | BODY MASS INDEX: 20.38 KG/M2 | HEIGHT: 63 IN | DIASTOLIC BLOOD PRESSURE: 64 MMHG

## 2024-02-28 PROCEDURE — 6370000000 HC RX 637 (ALT 250 FOR IP): Performed by: ANESTHESIOLOGY

## 2024-02-28 PROCEDURE — 3600000004 HC SURGERY LEVEL 4 BASE: Performed by: ORTHOPAEDIC SURGERY

## 2024-02-28 PROCEDURE — 3600000014 HC SURGERY LEVEL 4 ADDTL 15MIN: Performed by: ORTHOPAEDIC SURGERY

## 2024-02-28 PROCEDURE — C1713 ANCHOR/SCREW BN/BN,TIS/BN: HCPCS | Performed by: ORTHOPAEDIC SURGERY

## 2024-02-28 PROCEDURE — 6360000002 HC RX W HCPCS: Performed by: ANESTHESIOLOGY

## 2024-02-28 PROCEDURE — 7100000000 HC PACU RECOVERY - FIRST 15 MIN: Performed by: ORTHOPAEDIC SURGERY

## 2024-02-28 PROCEDURE — 2500000003 HC RX 250 WO HCPCS: Performed by: NURSE ANESTHETIST, CERTIFIED REGISTERED

## 2024-02-28 PROCEDURE — 6360000002 HC RX W HCPCS: Performed by: NURSE ANESTHETIST, CERTIFIED REGISTERED

## 2024-02-28 PROCEDURE — 2720000010 HC SURG SUPPLY STERILE: Performed by: ORTHOPAEDIC SURGERY

## 2024-02-28 PROCEDURE — 7100000011 HC PHASE II RECOVERY - ADDTL 15 MIN: Performed by: ORTHOPAEDIC SURGERY

## 2024-02-28 PROCEDURE — 2709999900 HC NON-CHARGEABLE SUPPLY: Performed by: ORTHOPAEDIC SURGERY

## 2024-02-28 PROCEDURE — 2580000003 HC RX 258: Performed by: ANESTHESIOLOGY

## 2024-02-28 PROCEDURE — 6360000002 HC RX W HCPCS: Performed by: PHYSICIAN ASSISTANT

## 2024-02-28 PROCEDURE — 6360000002 HC RX W HCPCS: Performed by: ORTHOPAEDIC SURGERY

## 2024-02-28 PROCEDURE — 7100000001 HC PACU RECOVERY - ADDTL 15 MIN: Performed by: ORTHOPAEDIC SURGERY

## 2024-02-28 PROCEDURE — 7100000010 HC PHASE II RECOVERY - FIRST 15 MIN: Performed by: ORTHOPAEDIC SURGERY

## 2024-02-28 PROCEDURE — 3700000001 HC ADD 15 MINUTES (ANESTHESIA): Performed by: ORTHOPAEDIC SURGERY

## 2024-02-28 PROCEDURE — 3700000000 HC ANESTHESIA ATTENDED CARE: Performed by: ORTHOPAEDIC SURGERY

## 2024-02-28 RX ORDER — OXYCODONE HYDROCHLORIDE 5 MG/1
5 TABLET ORAL
Status: DISCONTINUED | OUTPATIENT
Start: 2024-02-28 | End: 2024-02-28 | Stop reason: HOSPADM

## 2024-02-28 RX ORDER — SODIUM CHLORIDE 0.9 % (FLUSH) 0.9 %
5-40 SYRINGE (ML) INJECTION PRN
Status: DISCONTINUED | OUTPATIENT
Start: 2024-02-28 | End: 2024-02-28 | Stop reason: HOSPADM

## 2024-02-28 RX ORDER — SODIUM CHLORIDE 9 MG/ML
INJECTION, SOLUTION INTRAVENOUS PRN
Status: DISCONTINUED | OUTPATIENT
Start: 2024-02-28 | End: 2024-02-28 | Stop reason: HOSPADM

## 2024-02-28 RX ORDER — PROPOFOL 10 MG/ML
INJECTION, EMULSION INTRAVENOUS PRN
Status: DISCONTINUED | OUTPATIENT
Start: 2024-02-28 | End: 2024-02-28 | Stop reason: SDUPTHER

## 2024-02-28 RX ORDER — FENTANYL CITRATE 50 UG/ML
100 INJECTION, SOLUTION INTRAMUSCULAR; INTRAVENOUS
Status: DISCONTINUED | OUTPATIENT
Start: 2024-02-28 | End: 2024-02-28 | Stop reason: HOSPADM

## 2024-02-28 RX ORDER — NEOSTIGMINE METHYLSULFATE 1 MG/ML
INJECTION, SOLUTION INTRAVENOUS PRN
Status: DISCONTINUED | OUTPATIENT
Start: 2024-02-28 | End: 2024-02-28 | Stop reason: SDUPTHER

## 2024-02-28 RX ORDER — TRANEXAMIC ACID 100 MG/ML
INJECTION, SOLUTION INTRAVENOUS PRN
Status: DISCONTINUED | OUTPATIENT
Start: 2024-02-28 | End: 2024-02-28 | Stop reason: SDUPTHER

## 2024-02-28 RX ORDER — ROPIVACAINE HYDROCHLORIDE 5 MG/ML
INJECTION, SOLUTION EPIDURAL; INFILTRATION; PERINEURAL PRN
Status: DISCONTINUED | OUTPATIENT
Start: 2024-02-28 | End: 2024-02-28 | Stop reason: HOSPADM

## 2024-02-28 RX ORDER — PROCHLORPERAZINE EDISYLATE 5 MG/ML
5 INJECTION INTRAMUSCULAR; INTRAVENOUS
Status: DISCONTINUED | OUTPATIENT
Start: 2024-02-28 | End: 2024-02-28 | Stop reason: HOSPADM

## 2024-02-28 RX ORDER — MELOXICAM 7.5 MG/1
7.5 TABLET ORAL DAILY
Qty: 30 TABLET | Refills: 0 | Status: SHIPPED | OUTPATIENT
Start: 2024-02-28

## 2024-02-28 RX ORDER — ROCURONIUM BROMIDE 10 MG/ML
INJECTION, SOLUTION INTRAVENOUS PRN
Status: DISCONTINUED | OUTPATIENT
Start: 2024-02-28 | End: 2024-02-28 | Stop reason: SDUPTHER

## 2024-02-28 RX ORDER — ONDANSETRON 2 MG/ML
INJECTION INTRAMUSCULAR; INTRAVENOUS PRN
Status: DISCONTINUED | OUTPATIENT
Start: 2024-02-28 | End: 2024-02-28 | Stop reason: SDUPTHER

## 2024-02-28 RX ORDER — GLYCOPYRROLATE 0.2 MG/ML
INJECTION INTRAMUSCULAR; INTRAVENOUS PRN
Status: DISCONTINUED | OUTPATIENT
Start: 2024-02-28 | End: 2024-02-28 | Stop reason: SDUPTHER

## 2024-02-28 RX ORDER — DIPHENHYDRAMINE HYDROCHLORIDE 50 MG/ML
12.5 INJECTION INTRAMUSCULAR; INTRAVENOUS EVERY 6 HOURS PRN
Status: DISCONTINUED | OUTPATIENT
Start: 2024-02-28 | End: 2024-02-28 | Stop reason: HOSPADM

## 2024-02-28 RX ORDER — MIDAZOLAM HYDROCHLORIDE 2 MG/2ML
2 INJECTION, SOLUTION INTRAMUSCULAR; INTRAVENOUS
Status: DISCONTINUED | OUTPATIENT
Start: 2024-02-28 | End: 2024-02-28 | Stop reason: HOSPADM

## 2024-02-28 RX ORDER — HALOPERIDOL 5 MG/ML
1 INJECTION INTRAMUSCULAR
Status: DISCONTINUED | OUTPATIENT
Start: 2024-02-28 | End: 2024-02-28 | Stop reason: HOSPADM

## 2024-02-28 RX ORDER — IPRATROPIUM BROMIDE AND ALBUTEROL SULFATE 2.5; .5 MG/3ML; MG/3ML
1 SOLUTION RESPIRATORY (INHALATION)
Status: DISCONTINUED | OUTPATIENT
Start: 2024-02-28 | End: 2024-02-28 | Stop reason: HOSPADM

## 2024-02-28 RX ORDER — FENTANYL CITRATE 50 UG/ML
INJECTION, SOLUTION INTRAMUSCULAR; INTRAVENOUS PRN
Status: DISCONTINUED | OUTPATIENT
Start: 2024-02-28 | End: 2024-02-28 | Stop reason: SDUPTHER

## 2024-02-28 RX ORDER — DEXAMETHASONE SODIUM PHOSPHATE 10 MG/ML
INJECTION INTRAMUSCULAR; INTRAVENOUS PRN
Status: DISCONTINUED | OUTPATIENT
Start: 2024-02-28 | End: 2024-02-28 | Stop reason: SDUPTHER

## 2024-02-28 RX ORDER — HYDROMORPHONE HYDROCHLORIDE 2 MG/ML
0.5 INJECTION, SOLUTION INTRAMUSCULAR; INTRAVENOUS; SUBCUTANEOUS EVERY 5 MIN PRN
Status: DISCONTINUED | OUTPATIENT
Start: 2024-02-28 | End: 2024-02-28 | Stop reason: HOSPADM

## 2024-02-28 RX ORDER — EPINEPHRINE 1 MG/ML(1)
AMPUL (ML) INJECTION PRN
Status: DISCONTINUED | OUTPATIENT
Start: 2024-02-28 | End: 2024-02-28 | Stop reason: HOSPADM

## 2024-02-28 RX ORDER — CLINDAMYCIN PHOSPHATE 900 MG/50ML
900 INJECTION, SOLUTION INTRAVENOUS EVERY 8 HOURS
Status: DISCONTINUED | OUTPATIENT
Start: 2024-02-28 | End: 2024-02-28 | Stop reason: HOSPADM

## 2024-02-28 RX ORDER — SODIUM CHLORIDE, SODIUM LACTATE, POTASSIUM CHLORIDE, CALCIUM CHLORIDE 600; 310; 30; 20 MG/100ML; MG/100ML; MG/100ML; MG/100ML
INJECTION, SOLUTION INTRAVENOUS CONTINUOUS
Status: DISCONTINUED | OUTPATIENT
Start: 2024-02-28 | End: 2024-02-28 | Stop reason: HOSPADM

## 2024-02-28 RX ORDER — LIDOCAINE HYDROCHLORIDE 20 MG/ML
INJECTION, SOLUTION EPIDURAL; INFILTRATION; INTRACAUDAL; PERINEURAL PRN
Status: DISCONTINUED | OUTPATIENT
Start: 2024-02-28 | End: 2024-02-28 | Stop reason: SDUPTHER

## 2024-02-28 RX ORDER — ACETAMINOPHEN 500 MG
1000 TABLET ORAL ONCE
Status: COMPLETED | OUTPATIENT
Start: 2024-02-28 | End: 2024-02-28

## 2024-02-28 RX ORDER — LIDOCAINE HYDROCHLORIDE ANHYDROUS AND DEXTROSE MONOHYDRATE 5; 400 G/100ML; MG/100ML
INJECTION, SOLUTION INTRAVENOUS CONTINUOUS PRN
Status: DISCONTINUED | OUTPATIENT
Start: 2024-02-28 | End: 2024-02-28 | Stop reason: SDUPTHER

## 2024-02-28 RX ORDER — LIDOCAINE HYDROCHLORIDE 10 MG/ML
1 INJECTION, SOLUTION INFILTRATION; PERINEURAL
Status: DISCONTINUED | OUTPATIENT
Start: 2024-02-28 | End: 2024-02-28 | Stop reason: HOSPADM

## 2024-02-28 RX ORDER — KETAMINE HYDROCHLORIDE 50 MG/ML
INJECTION, SOLUTION INTRAMUSCULAR; INTRAVENOUS PRN
Status: DISCONTINUED | OUTPATIENT
Start: 2024-02-28 | End: 2024-02-28 | Stop reason: SDUPTHER

## 2024-02-28 RX ORDER — SODIUM CHLORIDE 0.9 % (FLUSH) 0.9 %
5-40 SYRINGE (ML) INJECTION EVERY 12 HOURS SCHEDULED
Status: DISCONTINUED | OUTPATIENT
Start: 2024-02-28 | End: 2024-02-28 | Stop reason: HOSPADM

## 2024-02-28 RX ORDER — KETOROLAC TROMETHAMINE 30 MG/ML
INJECTION, SOLUTION INTRAMUSCULAR; INTRAVENOUS PRN
Status: DISCONTINUED | OUTPATIENT
Start: 2024-02-28 | End: 2024-02-28 | Stop reason: HOSPADM

## 2024-02-28 RX ORDER — ACETAMINOPHEN 325 MG/1
650 TABLET ORAL ONCE
Status: COMPLETED | OUTPATIENT
Start: 2024-02-28 | End: 2024-02-28

## 2024-02-28 RX ORDER — SODIUM CHLORIDE 9 MG/ML
INJECTION, SOLUTION INTRAVENOUS CONTINUOUS
Status: DISCONTINUED | OUTPATIENT
Start: 2024-02-28 | End: 2024-02-28 | Stop reason: HOSPADM

## 2024-02-28 RX ADMIN — GLYCOPYRROLATE 0.4 MG: 0.2 INJECTION INTRAMUSCULAR; INTRAVENOUS at 12:06

## 2024-02-28 RX ADMIN — KETAMINE HYDROCHLORIDE 10 MG: 50 INJECTION, SOLUTION INTRAMUSCULAR; INTRAVENOUS at 11:42

## 2024-02-28 RX ADMIN — HYDROMORPHONE HYDROCHLORIDE 0.5 MG: 2 INJECTION, SOLUTION INTRAMUSCULAR; INTRAVENOUS; SUBCUTANEOUS at 12:42

## 2024-02-28 RX ADMIN — ONDANSETRON 4 MG: 2 INJECTION INTRAMUSCULAR; INTRAVENOUS at 10:31

## 2024-02-28 RX ADMIN — TRANEXAMIC ACID 1000 MG: 100 INJECTION, SOLUTION INTRAVENOUS at 11:00

## 2024-02-28 RX ADMIN — FENTANYL CITRATE 25 MCG: 50 INJECTION, SOLUTION INTRAMUSCULAR; INTRAVENOUS at 11:00

## 2024-02-28 RX ADMIN — FENTANYL CITRATE 25 MCG: 50 INJECTION, SOLUTION INTRAMUSCULAR; INTRAVENOUS at 12:07

## 2024-02-28 RX ADMIN — PHENYLEPHRINE HYDROCHLORIDE 100 MCG: 0.1 INJECTION, SOLUTION INTRAVENOUS at 10:55

## 2024-02-28 RX ADMIN — ACETAMINOPHEN 1000 MG: 500 TABLET ORAL at 08:52

## 2024-02-28 RX ADMIN — HYDROMORPHONE HYDROCHLORIDE 0.5 MG: 2 INJECTION, SOLUTION INTRAMUSCULAR; INTRAVENOUS; SUBCUTANEOUS at 12:48

## 2024-02-28 RX ADMIN — CLINDAMYCIN PHOSPHATE 900 MG: 900 INJECTION, SOLUTION INTRAVENOUS at 10:25

## 2024-02-28 RX ADMIN — Medication 3 MG: at 12:06

## 2024-02-28 RX ADMIN — PHENYLEPHRINE HYDROCHLORIDE 100 MCG: 0.1 INJECTION, SOLUTION INTRAVENOUS at 10:47

## 2024-02-28 RX ADMIN — ACETAMINOPHEN 650 MG: 325 TABLET ORAL at 13:44

## 2024-02-28 RX ADMIN — DIPHENHYDRAMINE HYDROCHLORIDE 12.5 MG: 50 INJECTION, SOLUTION INTRAMUSCULAR; INTRAVENOUS at 13:07

## 2024-02-28 RX ADMIN — ROCURONIUM BROMIDE 50 MG: 10 INJECTION, SOLUTION INTRAVENOUS at 10:19

## 2024-02-28 RX ADMIN — DEXAMETHASONE SODIUM PHOSPHATE 10 MG: 10 INJECTION INTRAMUSCULAR; INTRAVENOUS at 10:31

## 2024-02-28 RX ADMIN — LIDOCAINE HYDROCHLORIDE 60 MG: 20 INJECTION, SOLUTION EPIDURAL; INFILTRATION; INTRACAUDAL; PERINEURAL at 10:18

## 2024-02-28 RX ADMIN — SODIUM CHLORIDE, POTASSIUM CHLORIDE, SODIUM LACTATE AND CALCIUM CHLORIDE: 600; 310; 30; 20 INJECTION, SOLUTION INTRAVENOUS at 08:53

## 2024-02-28 RX ADMIN — FENTANYL CITRATE 50 MCG: 50 INJECTION, SOLUTION INTRAMUSCULAR; INTRAVENOUS at 10:17

## 2024-02-28 RX ADMIN — KETAMINE HYDROCHLORIDE 20 MG: 50 INJECTION, SOLUTION INTRAMUSCULAR; INTRAVENOUS at 10:17

## 2024-02-28 RX ADMIN — PROPOFOL 120 MG: 10 INJECTION, EMULSION INTRAVENOUS at 10:18

## 2024-02-28 RX ADMIN — KETAMINE HYDROCHLORIDE 10 MG: 50 INJECTION, SOLUTION INTRAMUSCULAR; INTRAVENOUS at 11:00

## 2024-02-28 RX ADMIN — LIDOCAINE HYDROCHLORIDE 1 MG/KG/HR: 4 INJECTION, SOLUTION INTRAVENOUS at 10:28

## 2024-02-28 ASSESSMENT — PAIN DESCRIPTION - ONSET: ONSET: OTHER (COMMENT)

## 2024-02-28 ASSESSMENT — PAIN SCALES - GENERAL
PAINLEVEL_OUTOF10: 5
PAINLEVEL_OUTOF10: 4

## 2024-02-28 ASSESSMENT — PAIN DESCRIPTION - LOCATION: LOCATION: HIP

## 2024-02-28 ASSESSMENT — PAIN DESCRIPTION - ORIENTATION: ORIENTATION: RIGHT

## 2024-02-28 NOTE — ANESTHESIA POSTPROCEDURE EVALUATION
Department of Anesthesiology  Postprocedure Note    Patient: Sona Wisdom  MRN: [id number]  YOB: 1980  Date of evaluation: 2/28/2024    Procedure Summary       Date: 02/28/24 Room / Location: [facility] OP OR 03 / [facility]    Anesthesia Start: 1006 Anesthesia Stop: 1233    Procedure: RIGHT HIP ARTHROSCOPY WITH LABRAL REPAIR, FEMOROPLASTY AND ACETABULOPLASTY (Right: Hip) Diagnosis:       Femoral acetabular impingement      Right hip pain      Tear of right acetabular labrum, initial encounter      (Femoral acetabular impingement [M25.859])      (Right hip pain [M25.551])      (Tear of right acetabular labrum, initial encounter [S73.191A])    Surgeons: Steven Rogers MD Responsible Provider: Anand Pelaez MD    Anesthesia Type: General ASA Status: 1            Anesthesia Type: General    Cedric Phase I: Cedric Score: 8    Cedric Phase II:      Anesthesia Post Evaluation    Patient location during evaluation: PACU  Patient participation: complete - patient participated  Level of consciousness: awake and sleepy but conscious  Pain score: 3  Airway patency: patent  Nausea & Vomiting: no nausea  Cardiovascular status: hemodynamically stable  Respiratory status: acceptable and nonlabored ventilation  Hydration status: stable  Multimodal analgesia pain management approach  Pain management: adequate    No notable events documented.

## 2024-02-28 NOTE — INTERVAL H&P NOTE
Update History & Physical    The Patient's History and Physical was reviewed   I discussed the surgery and patients medical condition with the patient.  The chart was reviewed with the patient and I examined the patient.    There was no change from previous note.  The surgical site was confirmed by the patient and me.    CV: RRR  RESP: CTAB    Plan:  The risk, benefits, expected outcome, and alternative to the recommended procedure have been discussed with the patient.  Patient understands and elects to proceed with the procedure as planned.    Electronically signed by Steven Rogers MD on 02/28/24 10:09 AM

## 2024-02-28 NOTE — DISCHARGE INSTRUCTIONS
POST-OP INSTRUCTIONS FOR HIP ARTHROSCOPY   (Dr. Rogers)    Day of Surgery:  DIET: Begin with liquids and light foods (jello, soup, etc). Progress to your normal diet if you are not nauseated.    MEDICATION:                                                          1.Pain medication: Norco (hydrocodone/acetaminophen) or Oxycodone. This is strong pain medication, use caution with walking, climbing stairs. Do not combine pain medication with alcohol.  If you are taking oxycodone, you may also take Tylenol (acetaminophen) 500mg tabs. 2 tabs every 8 hours. DO NOT take Tylenol (acetaminophen) if you are given Norco as this medicine already contains acetaminophen             -Slowly wean off the pain medication as the pain improves.                                                                  2.Stool Softener. Pain medication can cause constipation. Be sure to drink plenty of water and take an over the counter stool softener as needed.           3. Heterotopic Bone Prophylaxis: Indomethacin 1 tab x 3 days. You will then begin taking Mobic 1 tab daily for one month.  These medications can upset your stomach. Take them with food. If you experience GI discomfort or heartburn, add Prilosec OTC once daily for the month, and call the office.    ICE: Do NOT put ice machine directly on your skin. Use it frequently for the first 72 hours. You may also use a regular ice bag/pack, 20 minutes at a time. You may use the ice machine continuously if your skin is not irritated or burning from it    SHOWERING: No showering until post op day 3.  Leave bandages in place.    ACTIVITY: Use crutches with partial weightbearing, flat foot on the operative leg. This should only be the weight of your leg. DO NOT walk on your toes. Begin ankle pumps.    First & Second Post-OP Day:  Meds: continue as instructed above.  Bandages: No showering, keep bandage in place. You may experience liquid drainage from the hip onto the bandages which is

## 2024-02-28 NOTE — ANESTHESIA PRE PROCEDURE
Findings:       Anesthesia Plan      general     ASA 1     (Plan multi-modal analgesia with acet., NSAIDS, ketamine, lidocaine, and some opioids)  Induction: intravenous.      Anesthetic plan and risks discussed with patient and mother.      Plan discussed with CRNA.                JOSE FLORIAN MD   2/28/2024

## 2024-02-29 ENCOUNTER — HOSPITAL ENCOUNTER (OUTPATIENT)
Dept: PHYSICAL THERAPY | Age: 44
Setting detail: RECURRING SERIES
End: 2024-02-29
Attending: ORTHOPAEDIC SURGERY
Payer: COMMERCIAL

## 2024-02-29 DIAGNOSIS — R26.2 DIFFICULTY IN WALKING, NOT ELSEWHERE CLASSIFIED: ICD-10-CM

## 2024-02-29 DIAGNOSIS — M25.551 PAIN IN RIGHT HIP: Primary | ICD-10-CM

## 2024-02-29 DIAGNOSIS — M25.651 STIFFNESS OF RIGHT HIP, NOT ELSEWHERE CLASSIFIED: ICD-10-CM

## 2024-02-29 PROCEDURE — 97162 PT EVAL MOD COMPLEX 30 MIN: CPT

## 2024-02-29 ASSESSMENT — PAIN SCALES - GENERAL: PAINLEVEL_OUTOF10: 3

## 2024-03-01 NOTE — THERAPY EVALUATION
Sona Wisdom  : 1980  Primary: [employer]  Secondary:  [facility] @ [place] [address]  [facility] [address]  Phone: [phone]  Fax: [phone] Plan Frequency: 2x/week  Plan of Care/Certification Expiration Date: 24        Plan of Care/Certification Expiration Date:  Plan of Care/Certification Expiration Date: 24    Frequency/Duration: Plan Frequency: 2x/week      Time In/Out:   Time In: 1530  Time Out: 1635      PT Visit Info:         Visit Count:  1                OUTPATIENT PHYSICAL THERAPY:             Initial Assessment 2024               Episode (right hip labral repair)         Treatment Diagnosis:     Pain in right hip  Stiffness of right hip, not elsewhere classified  Difficulty in walking, not elsewhere classified  Medical/Referring Diagnosis:    Femoral acetabular impingement [M25.859]    Referring Physician:  Steven Rogers MD MD Orders:  PT Eval and Treat   Return MD Appt:  unknown   Date of Onset:  DOS: Onset Date: 24    Allergies:  Amoxicillin and Cefaclor  Restrictions/Precautions:    Hip labral repair guidelines      Medications Last Reviewed:  2024     SUBJECTIVE   History of Injury/Illness (Reason for Referral):  Sona reports right hip pain and stiffness following a right hip labral repair, chondroplasty, rim trim and femoroplasty. She reports right hip pain began following an L5 issue that caused pain in both legs and that she thinks hip pain followed due to compensatory movements.  She reports having left hip labral repair 6 years ago with Dr Barnett.  She reports that she saw PT pre-operatively but that hip pain did not resolve and was limiting her from being active with walking, jogging, hiking, and playing soccer with her kids.     Patient Stated Goal(s):  \"return to active lifestyle\"  Initial Pyain Level:      3/10   Post Session Pain Level:     4/10  Past Medical

## 2024-03-07 ENCOUNTER — HOSPITAL ENCOUNTER (OUTPATIENT)
Dept: PHYSICAL THERAPY | Age: 44
Setting detail: RECURRING SERIES
Discharge: HOME | End: 2024-03-10
Attending: ORTHOPAEDIC SURGERY
Payer: COMMERCIAL

## 2024-03-07 PROCEDURE — 97110 THERAPEUTIC EXERCISES: CPT

## 2024-03-07 PROCEDURE — 97140 MANUAL THERAPY 1/> REGIONS: CPT

## 2024-03-07 ASSESSMENT — PAIN SCALES - GENERAL: PAINLEVEL_OUTOF10: 2

## 2024-03-07 NOTE — PROGRESS NOTES
Name: Sona Wisdom  YOB: 1980  Gender: female  MRN: [id number]    Procedure Performed:   1) right  Hip arthroscopy with labral repair  2) right Hip arthroscopy with Cam femoroplasty  3) right  hip arthroscopy with rim trimming acetabuloplasty     Date of Procedure: 2/28/24    Subjective: Returns 2 weeks status post the above procedure.She is no longer taking oxycodone.  She states she still has some pain but it is different than before surgery.  She is working with Elizabeth for physical therapy and progressing.  She states she is having a little adductor pain at this time.    Physical Examination: Incisions clean dry and intact, no sign of infection. Motor and sensory intact.  Calf is soft and nontender to palpation.  Dorsi and plantarflexion makes intact.  Dorsalis his pulse 2+.  No rashes wounds or sores noted around incisions.    Assessment:   1. Status post orthopedic surgery, follow-up exam         Plan:   Doing well.  Continue PT per Hip arthroscopy labral repair protocol   Follow up in 4 weeks with Dr. Rogers    She did not like the Mobic and is taking naproxen twice a day instead of the Mobic for HO prevention

## 2024-03-07 NOTE — PROGRESS NOTES
Sona Wisdom  : 1980  Primary: [employer]  Secondary:  [facility] @ [place] [address] [place] [address]  Phone: [phone]  Fax: [phone] Plan Frequency: 2x/week    Plan of Care/Certification Expiration Date: 24        Plan of Care/Certification Expiration Date:  Plan of Care/Certification Expiration Date: 24    Frequency/Duration:   Plan Frequency: 2x/week      Time In/Out:   Time In: 0800  Time Out: 0900      PT Visit Info:         Visit Count:  2    OUTPATIENT PHYSICAL THERAPY:   Treatment Note 3/7/2024       Episode  (right hip labral repair)               Treatment Diagnosis:    Pain in right hip  Stiffness of right hip, not elsewhere classified  Difficulty in walking, not elsewhere classified  Medical/Referring Diagnosis:    Femoral acetabular impingement [M25.859]    Referring Physician:  Steven Rogers MD MD Orders:  PT Eval and Treat   Return MD Appt:  next week    Date of Onset:  Onset Date: 24     Allergies:   Amoxicillin and Cefaclor  Restrictions/Precautions:   Post Surgical Precautions: labral repair       Interventions Planned (Treatment may consist of any combination of the following):     See Assessment Note    Subjective Comments:   Sona reports that she is doing well overall, just still pretty sore generally around her hip.  Some low back discomfort from lying supine so much.    Initial Pain Level::     2/10  Post Session Pain Level:       3/10  Medications Last Reviewed:  3/7/2024  Updated Objective Findings:   flexion 90 degrees; ER to 20 degrees, IR 30 degrees   Treatment   Manual Therapy (15  Minutes): Manual techniques to facilitate improved motion and decreased pain. (Used abbreviations: MET - muscle energy technique; PNF - proprioceptive neuromuscular facilitation; NMR - neuromuscular re-education; a/p - anterior to posterior; p/a - posterior to anterior)   STM to quadriceps, hip

## 2024-03-11 ENCOUNTER — OFFICE VISIT (OUTPATIENT)
Dept: ORTHOPEDIC SURGERY | Age: 44
End: 2024-03-11

## 2024-03-11 DIAGNOSIS — Z09 STATUS POST ORTHOPEDIC SURGERY, FOLLOW-UP EXAM: Primary | ICD-10-CM

## 2024-03-11 PROCEDURE — 99024 POSTOP FOLLOW-UP VISIT: CPT | Performed by: PHYSICIAN ASSISTANT

## 2024-03-13 ENCOUNTER — HOSPITAL ENCOUNTER (OUTPATIENT)
Dept: PHYSICAL THERAPY | Age: 44
Setting detail: RECURRING SERIES
Discharge: HOME | End: 2024-03-16
Attending: ORTHOPAEDIC SURGERY
Payer: COMMERCIAL

## 2024-03-13 PROCEDURE — 97110 THERAPEUTIC EXERCISES: CPT

## 2024-03-13 PROCEDURE — 97140 MANUAL THERAPY 1/> REGIONS: CPT

## 2024-03-13 ASSESSMENT — PAIN SCALES - GENERAL: PAINLEVEL_OUTOF10: 2

## 2024-03-13 NOTE — PROGRESS NOTES
[facility]   4/9/2024  9:00 AM Elizabeth Wright, PT [facility]   4/12/2024  9:00 AM Elizabeth Wright, PT [facility]   4/16/2024  9:00 AM Elizabeth Wright, PT [facility]   4/19/2024  9:00 AM Elizabeth Wright, PT [facility]   4/23/2024  9:00 AM Elizabeth Wright, PT [facility]   4/26/2024  9:00 AM Elizabeth Wright, PT [facility]   4/30/2024  9:00 AM Elizabeth Wright, PT [facility]   5/3/2024  9:00 AM Elizabeth Wright, PT [facility]     PHASE I   Weeks 0 - 2   Flat foot 25% weight bearing   External rotation limited to 20°, flexion limited to 90°   Supine hip log rolling for IR   Sustained stretching for psoas   Stationary bike, stool rotations for IR only, supine bridges, pelvic tilts, hip isometric (no flexion)      PHASE II   Weeks 3 - 4   Progress WB to full by end of week 4   Increase ER at week 4   Bent knee fall outs begin   Stool rotations for ER begin week 4   Emphasize glute muscle activation   Clam shells, step downs, hip hiking   Aquatic therapy may begin week 4      PHASE III   Weeks 5 - 8   ROM as tolerated, full WB   Standing BAPS rotation   ER with TYLER   Hip joint mobs with mobilization belt   Progress strengthening/closed chain exercises   Balance and proprioception      PHASE IV   Weeks 9+   Endurance activities around hip   Dynamic and advanced strengthening   Plyometric and agility starting week 12   Treadmill running starting week 12

## 2024-03-14 ENCOUNTER — TELEPHONE (OUTPATIENT)
Dept: ORTHOPEDIC SURGERY | Age: 44
End: 2024-03-14

## 2024-03-20 ENCOUNTER — HOSPITAL ENCOUNTER (OUTPATIENT)
Dept: PHYSICAL THERAPY | Age: 44
Setting detail: RECURRING SERIES
Discharge: HOME | End: 2024-03-23
Attending: ORTHOPAEDIC SURGERY
Payer: COMMERCIAL

## 2024-03-20 PROCEDURE — 97140 MANUAL THERAPY 1/> REGIONS: CPT

## 2024-03-20 PROCEDURE — 97110 THERAPEUTIC EXERCISES: CPT

## 2024-03-20 ASSESSMENT — PAIN SCALES - GENERAL: PAINLEVEL_OUTOF10: 1

## 2024-03-20 NOTE — PROGRESS NOTES
Sona Wisdom  : 1980  Primary: [employer]  Secondary:  [facility] @ [place] [address] [place] [address]  Phone: [phone]  Fax: [phone] Plan Frequency: 2x/week    Plan of Care/Certification Expiration Date: 24        Plan of Care/Certification Expiration Date:  Plan of Care/Certification Expiration Date: 24    Frequency/Duration:   Plan Frequency: 2x/week      Time In/Out:   Time In: 0814  Time Out: 0900      PT Visit Info:         Visit Count:  4    OUTPATIENT PHYSICAL THERAPY:   Treatment Note 3/20/2024       Episode  (right hip labral repair)               Treatment Diagnosis:    Pain in right hip  Stiffness of right hip, not elsewhere classified  Difficulty in walking, not elsewhere classified  Medical/Referring Diagnosis:    Femoral acetabular impingement [M25.859]    Referring Physician:  Steven Rogers MD MD Orders:  PT Eval and Treat   Return MD Appt:  next week    Date of Onset:  Onset Date: 24     Allergies:   Amoxicillin and Cefaclor  Restrictions/Precautions:   Post Surgical Precautions: labral repair       Interventions Planned (Treatment may consist of any combination of the following):     See Assessment Note    Subjective Comments:   Sona reports that her adductors are doing better.  She reports general soreness that is mild and tenderness to the touch at incision sites.   Initial Pain Level::     1/10  Post Session Pain Level:       1/10  Medications Last Reviewed:  3/20/2024  Updated Objective Findings:  None Today  Treatment   Manual Therapy (15  Minutes): Manual techniques to facilitate improved motion and decreased pain. (Used abbreviations: MET - muscle energy technique; PNF - proprioceptive neuromuscular facilitation; NMR - neuromuscular re-education; a/p - anterior to posterior; p/a - posterior to anterior)   STM to quadriceps, hip flexors, TFL, adductors   Circumduction at

## 2024-03-22 ENCOUNTER — HOSPITAL ENCOUNTER (OUTPATIENT)
Dept: PHYSICAL THERAPY | Age: 44
Setting detail: RECURRING SERIES
Discharge: HOME | End: 2024-03-25
Attending: ORTHOPAEDIC SURGERY
Payer: COMMERCIAL

## 2024-03-22 PROCEDURE — 97110 THERAPEUTIC EXERCISES: CPT

## 2024-03-22 PROCEDURE — 97140 MANUAL THERAPY 1/> REGIONS: CPT

## 2024-03-22 ASSESSMENT — PAIN SCALES - GENERAL: PAINLEVEL_OUTOF10: 2

## 2024-03-22 NOTE — PROGRESS NOTES
Sona Wisdom  : 1980  Primary: [employer]  Secondary:  [facility] @ [place] [address] [place] [address]  Phone: [phone]  Fax: [phone] Plan Frequency: 2x/week    Plan of Care/Certification Expiration Date: 24        Plan of Care/Certification Expiration Date:  Plan of Care/Certification Expiration Date: 24    Frequency/Duration:   Plan Frequency: 2x/week      Time In/Out:   Time In: 0900  Time Out: 1000      PT Visit Info:    Progress Note Counter: 5      Visit Count:  5    OUTPATIENT PHYSICAL THERAPY:   Treatment Note 3/22/2024       Episode  (right hip labral repair)               Treatment Diagnosis:    Pain in right hip  Stiffness of right hip, not elsewhere classified  Difficulty in walking, not elsewhere classified  Medical/Referring Diagnosis:    Femoral acetabular impingement [M25.859]    Referring Physician:  Steven Rogers MD MD Orders:  PT Eval and Treat   Return MD Appt:  next week    Date of Onset:  Onset Date: 24     Allergies:   Amoxicillin and Cefaclor  Restrictions/Precautions:   Post Surgical Precautions: labral repair       Interventions Planned (Treatment may consist of any combination of the following):     See Assessment Note    Subjective Comments:   Sona reports that she had some transient soreness and fatigue after her last visit.  She reports some soreness with increased activity level cleaning around the house yesterday.    Initial Pain Level::     2/10  Post Session Pain Level:       3/10  Medications Last Reviewed:  3/22/2024  Updated Objective Findings:  None Today  Treatment   Manual Therapy (25  Minutes): Manual techniques to facilitate improved motion and decreased pain. (Used abbreviations: MET - muscle energy technique; PNF - proprioceptive neuromuscular facilitation; NMR - neuromuscular re-education; a/p - anterior to posterior; p/a - posterior to anterior)   STM to

## 2024-03-26 ENCOUNTER — HOSPITAL ENCOUNTER (OUTPATIENT)
Dept: PHYSICAL THERAPY | Age: 44
Setting detail: RECURRING SERIES
End: 2024-03-26
Attending: ORTHOPAEDIC SURGERY
Payer: COMMERCIAL

## 2024-03-26 NOTE — PROGRESS NOTES
Sona Webster [employer]  : 1980  Primary: [employer]  Secondary:  [facility] [place] [address]  [phone]   [phone]      DATE: 3/26/2024    Patient canceled for appointment today due to childcare issue.  Will plan to follow up on next scheduled visit.      Elizabeth Wright, PT, DPT

## 2024-03-29 ENCOUNTER — TELEPHONE (OUTPATIENT)
Dept: ORTHOPEDIC SURGERY | Age: 44
End: 2024-03-29

## 2024-03-29 ENCOUNTER — HOSPITAL ENCOUNTER (OUTPATIENT)
Dept: PHYSICAL THERAPY | Age: 44
Setting detail: RECURRING SERIES
Discharge: HOME | End: 2024-04-01
Attending: ORTHOPAEDIC SURGERY
Payer: COMMERCIAL

## 2024-03-29 PROCEDURE — 97140 MANUAL THERAPY 1/> REGIONS: CPT

## 2024-03-29 PROCEDURE — 97110 THERAPEUTIC EXERCISES: CPT

## 2024-03-29 ASSESSMENT — PAIN SCALES - GENERAL: PAINLEVEL_OUTOF10: 3

## 2024-03-29 NOTE — TELEPHONE ENCOUNTER
See note below, patient would like to reschedule her Post Op appt. Please call       Appointment Request From: Sona Wisdom     With Provider: Steven Rogers MD [[facility]]     Preferred Date Range: 4/11/2024 - 4/15/2024     Preferred Times: Monday Morning, Tuesday Morning, Wednesday Morning, Thursday Morning, Friday Morning     Reason for visit: Request an Appointment     Comments:  Post Op

## 2024-03-29 NOTE — PROGRESS NOTES
Sona Wisdom  : 1980  Primary: [employer]  Secondary:  [facility]note [facility] @ [place] [address] [place] [address]  Phone: [phone]  Fax: [phone] Plan Frequency: 2x/week    Plan of Care/Certification Expiration Date: 24        Plan of Care/Certification Expiration Date:  Plan of Care/Certification Expiration Date: 24    Frequency/Duration:   Plan Frequency: 2x/week      Time In/Out:   Time In: 0910  Time Out: 1000      PT Visit Info:    Progress Note Counter: 5      Visit Count:  6    OUTPATIENT PHYSICAL THERAPY:   Treatment Note 3/29/2024       Episode  (right hip labral repair)               Treatment Diagnosis:    Pain in right hip  Stiffness of right hip, not elsewhere classified  Difficulty in walking, not elsewhere classified  Medical/Referring Diagnosis:    Femoral acetabular impingement [M25.859]    Referring Physician:  Steven Rogers MD MD Orders:  PT Eval and Treat   Return MD Appt:  next week    Date of Onset:  Onset Date: 24     Allergies:   Amoxicillin and Cefaclor  Restrictions/Precautions:   Post Surgical Precautions: labral repair       Interventions Planned (Treatment may consist of any combination of the following):     See Assessment Note    Subjective Comments:   Sona reports that she is having less pinching in her hip, but that it has been replaced with general joint aching that she attributes to increased weightbearing and increased activity level.  Still having to lie down a lot and take NSAID and tylenol to manage pain.    Initial Pain Level::     3/10  Post Session Pain Level:       3/10  Medications Last Reviewed:  3/29/2024  Updated Objective Findings:  None Today  Treatment   Manual Therapy (15  Minutes): Manual techniques to facilitate improved motion and decreased pain. (Used abbreviations: MET - muscle energy technique; PNF - proprioceptive neuromuscular facilitation; NMR -

## 2024-04-02 ENCOUNTER — HOSPITAL ENCOUNTER (OUTPATIENT)
Dept: PHYSICAL THERAPY | Age: 44
Setting detail: RECURRING SERIES
Discharge: HOME | End: 2024-04-05
Attending: ORTHOPAEDIC SURGERY
Payer: COMMERCIAL

## 2024-04-02 PROCEDURE — 97110 THERAPEUTIC EXERCISES: CPT

## 2024-04-02 PROCEDURE — 97140 MANUAL THERAPY 1/> REGIONS: CPT

## 2024-04-02 ASSESSMENT — PAIN SCALES - GENERAL: PAINLEVEL_OUTOF10: 2

## 2024-04-02 NOTE — PROGRESS NOTES
Sona Wisdom  : 1980  Primary: [employer]  Secondary:  [facility] @ [place] [address] [place] [address]  Phone: [phone]  Fax: [phone] Plan Frequency: 2x/week    Plan of Care/Certification Expiration Date: 24        Plan of Care/Certification Expiration Date:  Plan of Care/Certification Expiration Date: 24    Frequency/Duration:   Plan Frequency: 2x/week      Time In/Out:   Time In: 0900  Time Out: 1000      PT Visit Info:    Progress Note Counter: 7      Visit Count:  7    OUTPATIENT PHYSICAL THERAPY:   Treatment Note 2024       Episode  (right hip labral repair)               Treatment Diagnosis:    Pain in right hip  Stiffness of right hip, not elsewhere classified  Difficulty in walking, not elsewhere classified  Medical/Referring Diagnosis:    Femoral acetabular impingement [M25.859]    Referring Physician:  Steven Rogers MD MD Orders:  PT Eval and Treat   Return MD Appt:  next week    Date of Onset:  Onset Date: 24     Allergies:   Amoxicillin and Cefaclor  Restrictions/Precautions:   Post Surgical Precautions: labral repair       Interventions Planned (Treatment may consist of any combination of the following):     See Assessment Note    Subjective Comments:   Sona reports that she is doing well.  She reports some pain on Saturday but that she sat at her kids' soccer games and that most likely irritated her hip.  She reports fatigue, but not much pain following her last treatment.    Initial Pain Level::     2/10  Post Session Pain Level:       2/10  Medications Last Reviewed:  2024  Updated Objective Findings:  None Today  Treatment   Manual Therapy (15  Minutes): Manual techniques to facilitate improved motion and decreased pain. (Used abbreviations: MET - muscle energy technique; PNF - proprioceptive neuromuscular facilitation; NMR - neuromuscular re-education; a/p - anterior to

## 2024-04-05 ENCOUNTER — HOSPITAL ENCOUNTER (OUTPATIENT)
Dept: PHYSICAL THERAPY | Age: 44
Setting detail: RECURRING SERIES
Discharge: HOME | End: 2024-04-08
Attending: ORTHOPAEDIC SURGERY
Payer: COMMERCIAL

## 2024-04-05 PROCEDURE — 97140 MANUAL THERAPY 1/> REGIONS: CPT

## 2024-04-05 PROCEDURE — 97110 THERAPEUTIC EXERCISES: CPT

## 2024-04-05 ASSESSMENT — PAIN SCALES - GENERAL: PAINLEVEL_OUTOF10: 4

## 2024-04-05 NOTE — PROGRESS NOTES
STM to quadriceps, hip flexors, TFL, adductors   Circumduction at neutral and 80 degrees   Manual ROM: log roll IR, flexion to as tolerated; prone IR/ER   Lateral mobilization   THERAPEUTIC EXERCISE: (28 minutes):    Exercises per grid below to improve mobility and strength.  Required moderate verbal and tactile cues to promote proper body mechanics.  Progressed resistance, range, repetitions, and complexity of movement as indicated.       4/5/2024   Activity/Exercise Parameters                 Patient education    Diaphragmatic breathing  10 reps alone, 10 reps with bent knee fall outs    Bridge     Posterior pelvic tilt     Hip flexor stretch off edge  3 x 30 sec    Rhythmic stabilization in prone     Clamshells     SLR abduction  3 x 10    Quadruped   Cat/camel, mid range rocking   Get ups     Hip hinge     Squat     Half kneeling     Tall kneeling     Shuttle           Treatment/Session Summary:    Treatment Assessment:   Sona reported increased tightness and discomfort in general today, so CKC exercises were held.  Discussed trialing 1 crutch for periods during the day to progress weightbearing if tolerated.       Communication/Consultation:  None today  Equipment provided today:  None  Recommendations/Intent for next treatment session: Next visit will focus on progressing mobility and lower quarter strength.     >Total Treatment Billable Duration:  53 minutes   Time In: 0900  Time Out: 1000    Elizabeth Wright, PT         Charge Capture  [employer] [facility]  Appt Desk     Future Appointments   Date Time Provider Department Center   4/8/2024 10:40 AM Steven Rogers MD [facility]   4/9/2024  9:00 AM Elizabeth Wright, PT [facility]   4/12/2024  9:00 AM Elizabeth Wright, PT [facility]   4/16/2024  9:00 AM Elizabeth Wright, PT [facility]   4/19/2024  9:00 AM Elizabeth Wright, PT [facility]   4/23/2024  9:00 AM Elizabeth Wright, PT [facility]   4/26/2024  9:00 AM Elizabeth Wright, PT [facility]   4/30/2024  9:00 AM Kyle

## 2024-04-08 ENCOUNTER — OFFICE VISIT (OUTPATIENT)
Dept: ORTHOPEDIC SURGERY | Age: 44
End: 2024-04-08

## 2024-04-08 DIAGNOSIS — Z09 STATUS POST ORTHOPEDIC SURGERY, FOLLOW-UP EXAM: Primary | ICD-10-CM

## 2024-04-08 PROCEDURE — 99024 POSTOP FOLLOW-UP VISIT: CPT | Performed by: ORTHOPAEDIC SURGERY

## 2024-04-08 NOTE — PROGRESS NOTES
Name: Sona Wisdom  YOB: 1980  Gender: female  MRN: [id number]    Procedure Performed:   1) right  Hip arthroscopy with labral repair  2) right Hip arthroscopy with Cam femoroplasty  3) right  hip arthroscopy with rim trimming acetabuloplasty       Date of Procedure: 2/28/24       Subjective:Returns 6 weeks status post the above procedure. Doing well, having some pain coming off crutches      Physical Examination:Incisions clean dry and intact, no sign of infection. Motor and sensory intact. Tolerates flexion circumduction and logroll without significant pain      Assessment:   1. Status post orthopedic surgery, follow-up exam         Plan:   Doing well.  .   Continue PT per  hip arthroscopy protocol  Follow up in 4 weeks

## 2024-04-09 ENCOUNTER — HOSPITAL ENCOUNTER (OUTPATIENT)
Dept: PHYSICAL THERAPY | Age: 44
Setting detail: RECURRING SERIES
Discharge: HOME | End: 2024-04-12
Attending: ORTHOPAEDIC SURGERY
Payer: COMMERCIAL

## 2024-04-09 PROCEDURE — 97140 MANUAL THERAPY 1/> REGIONS: CPT

## 2024-04-09 ASSESSMENT — PAIN SCALES - GENERAL: PAINLEVEL_OUTOF10: 3

## 2024-04-10 NOTE — PROGRESS NOTES
Sona Wisdom  : 1980  Primary: [employer]  Secondary:  [facility] @ [place] [address] [place] [address]  Phone: [phone]  Fax: [phone] Plan Frequency: 2x/week    Plan of Care/Certification Expiration Date: 24        Plan of Care/Certification Expiration Date:  Plan of Care/Certification Expiration Date: 24    Frequency/Duration:   Plan Frequency: 2x/week      Time In/Out:   Time In: 0900  Time Out: 1000      PT Visit Info:    Progress Note Counter: 9      Visit Count:  9    OUTPATIENT PHYSICAL THERAPY:   Treatment Note 2024       Episode  (right hip labral repair)               Treatment Diagnosis:    Pain in right hip  Stiffness of right hip, not elsewhere classified  Difficulty in walking, not elsewhere classified  Medical/Referring Diagnosis:    Femoral acetabular impingement [M25.859]    Referring Physician:  Steven Rogers MD MD Orders:  PT Eval and Treat   Return MD Appt:  next week    Date of Onset:  Onset Date: 24     Allergies:   Amoxicillin and Cefaclor  Restrictions/Precautions:   Post Surgical Precautions: labral repair       Interventions Planned (Treatment may consist of any combination of the following):     See Assessment Note    Subjective Comments:   Sona reports that she is a little better than at last visit, but still having a fair amount of discomfort by the end of the day.   Initial Pain Level::     3/10  Post Session Pain Level:       2/10  Medications Last Reviewed:  2024  Updated Objective Findings:   see below   Left  Right    ER 50 50   IR  40  25   Flexion  135 115       Treatment   Manual Therapy (53  Minutes): Manual techniques to facilitate improved motion and decreased pain. (Used abbreviations: MET - muscle energy technique; PNF - proprioceptive neuromuscular facilitation; NMR - neuromuscular re-education; a/p - anterior to posterior; p/a - posterior to

## 2024-04-12 ENCOUNTER — HOSPITAL ENCOUNTER (OUTPATIENT)
Dept: PHYSICAL THERAPY | Age: 44
Setting detail: RECURRING SERIES
End: 2024-04-12
Attending: ORTHOPAEDIC SURGERY
Payer: COMMERCIAL

## 2024-04-12 NOTE — PROGRESS NOTES
Sona Webster Alyx  : 1980  Primary: [employer]  Secondary:  [facility] [place] [address]  [phone]   [phone]      DATE: 2024    Patient canceled for appointment today due to childcare conflict.  Will plan to follow up on next scheduled visit.      Elizabeth Wright, PT, DPT

## 2024-04-15 NOTE — PROGRESS NOTES
Sona Wisdom  : 1980  Primary: [employer]  Secondary:  [facility] @ [place] [address] [place] [address]  Phone: [phone]  Fax: [phone] Plan Frequency: 2x/week    Plan of Care/Certification Expiration Date: 24        Plan of Care/Certification Expiration Date:  Plan of Care/Certification Expiration Date: 24    Frequency/Duration:   Plan Frequency: 2x/week      Time In/Out:   Time In: 0900  Time Out: 1000      PT Visit Info:    Progress Note Counter: 1      Visit Count:  10    OUTPATIENT PHYSICAL THERAPY:   Treatment Note 2024       Episode  (right hip labral repair)               Treatment Diagnosis:    Pain in right hip  Stiffness of right hip, not elsewhere classified  Difficulty in walking, not elsewhere classified  Medical/Referring Diagnosis:    Femoral acetabular impingement [M25.859]    Referring Physician:  Steven Rogers MD MD Orders:  PT Eval and Treat   Return MD Appt:  next week    Date of Onset:  Onset Date: 24     Allergies:   Amoxicillin and Cefaclor  Restrictions/Precautions:   Post Surgical Precautions: labral repair       Interventions Planned (Treatment may consist of any combination of the following):     See Assessment Note    Subjective Comments:   Sona reports that she did not feel like the dry needling gave her much benefit other than some improved comfort in her adductors.  She reports that her hip is better than last week, but that it still hurts and limits her activity level at the end of the day.   Initial Pain Level::     310  Post Session Pain Level:       /10  Medications Last Reviewed:  2024  Updated Objective Findings:   see below   Left  Right    ER 50 50   IR  40  25   Flexion  135 115       Treatment   Manual Therapy (10  Minutes): Manual techniques to facilitate improved motion and decreased pain. (Used abbreviations: MET - muscle energy technique; PNF -

## 2024-04-15 NOTE — PROGRESS NOTES
Sona Wisdom  : 1980  Primary: [employer]  Secondary:  [facility] @ [place] [address] [address] [facility] [address]  Phone: [phone]  Fax: [phone] Plan Frequency: 2x/week  Plan of Care/Certification Expiration Date: 24        Plan of Care/Certification Expiration Date:  Plan of Care/Certification Expiration Date: 24    Frequency/Duration: Plan Frequency: 2x/week      Time In/Out:   Time In: 0900  Time Out: 1000      PT Visit Info:    Progress Note Counter: 1      Visit Count:  10                OUTPATIENT PHYSICAL THERAPY:             Progress Report 2024               Episode (right hip labral repair)         Treatment Diagnosis:     No data found  Medical/Referring Diagnosis:    Femoral acetabular impingement [M25.859]    Referring Physician:  Steven Rogers MD MD Orders:  PT Eval and Treat   Return MD Appt:  unknown   Date of Onset:  DOS: Onset Date: 24    Allergies:  Amoxicillin and Cefaclor  Restrictions/Precautions:    Hip labral repair guidelines      Medications Last Reviewed:  2024     SUBJECTIVE   History of Injury/Illness (Reason for Referral):  Sona reports right hip pain and stiffness following a right hip labral repair, chondroplasty, rim trim and femoroplasty. She reports right hip pain began following an L5 issue that caused pain in both legs and that she thinks hip pain followed due to compensatory movements.  She reports having left hip labral repair 6 years ago with Dr Barnett.  She reports that she saw PT pre-operatively but that hip pain did not resolve and was limiting her from being active with walking, jogging, hiking, and playing soccer with her kids.     Patient Stated Goal(s):  \"return to active lifestyle\"  Initial Pyain Level:      3/10   Post Session Pain Level:     1/10  Past Medical History/Comorbidities:   Ms. Wisdom  has a past medical history of Family history of

## 2024-04-16 ENCOUNTER — HOSPITAL ENCOUNTER (OUTPATIENT)
Dept: PHYSICAL THERAPY | Age: 44
Setting detail: RECURRING SERIES
Discharge: HOME | End: 2024-04-19
Attending: ORTHOPAEDIC SURGERY
Payer: COMMERCIAL

## 2024-04-16 PROCEDURE — 97110 THERAPEUTIC EXERCISES: CPT

## 2024-04-16 PROCEDURE — 97140 MANUAL THERAPY 1/> REGIONS: CPT

## 2024-04-16 ASSESSMENT — PAIN SCALES - GENERAL: PAINLEVEL_OUTOF10: 3

## 2024-04-23 ENCOUNTER — HOSPITAL ENCOUNTER (OUTPATIENT)
Dept: PHYSICAL THERAPY | Age: 44
Setting detail: RECURRING SERIES
Discharge: HOME | End: 2024-04-26
Attending: ORTHOPAEDIC SURGERY
Payer: COMMERCIAL

## 2024-04-23 PROCEDURE — 97110 THERAPEUTIC EXERCISES: CPT

## 2024-04-23 ASSESSMENT — PAIN SCALES - GENERAL: PAINLEVEL_OUTOF10: 1

## 2024-04-24 NOTE — PROGRESS NOTES
25% weight bearing   External rotation limited to 20°, flexion limited to 90°   Supine hip log rolling for IR   Sustained stretching for psoas   Stationary bike, stool rotations for IR only, supine bridges, pelvic tilts, hip isometric (no flexion)      PHASE II   Weeks 3 - 4   Progress WB to full by end of week 4   Increase ER at week 4   Bent knee fall outs begin   Stool rotations for ER begin week 4   Emphasize glute muscle activation   Clam shells, step downs, hip hiking   Aquatic therapy may begin week 4      PHASE III   Weeks 5 - 8   ROM as tolerated, full WB   Standing BAPS rotation   ER with TYLER   Hip joint mobs with mobilization belt   Progress strengthening/closed chain exercises   Balance and proprioception      PHASE IV   Weeks 9+   Endurance activities around hip   Dynamic and advanced strengthening   Plyometric and agility starting week 12   Treadmill running starting week 12

## 2024-04-29 NOTE — THERAPY RECERTIFICATION
Sona Wisdom  : 1980  Primary: [employer]  Secondary:  [facility] @ [place]  [place] [address]  Phone: [phone]  Fax: [phone] Plan Frequency: 1x/week  Plan of Care/Certification Expiration Date: 24        Plan of Care/Certification Expiration Date:  Plan of Care/Certification Expiration Date: 24    Frequency/Duration: Plan Frequency: 1x/week      Time In/Out:   Time In: 0900  Time Out: 1000      PT Visit Info:    Progress Note Counter: 2      Visit Count:  12                OUTPATIENT PHYSICAL THERAPY:             Recertification 2024               Episode (right hip labral repair)         Treatment Diagnosis:     Pain in right hip  Stiffness of right hip, not elsewhere classified  Difficulty in walking, not elsewhere classified  Medical/Referring Diagnosis:    Femoral acetabular impingement [M25.859]    Referring Physician:  Steven Rogers MD MD Orders:  PT Eval and Treat   Return MD Appt:  unknown   Date of Onset:  DOS: Onset Date: 24    Allergies:  Amoxicillin and Cefaclor  Restrictions/Precautions:    Hip labral repair guidelines      Medications Last Reviewed:  2024     SUBJECTIVE   History of Injury/Illness (Reason for Referral):  Sona reports right hip pain and stiffness following a right hip labral repair, chondroplasty, rim trim and femoroplasty. She reports right hip pain began following an L5 issue that caused pain in both legs and that she thinks hip pain followed due to compensatory movements.  She reports having left hip labral repair 6 years ago with Dr Barnett.  She reports that she saw PT pre-operatively but that hip pain did not resolve and was limiting her from being active with walking, jogging, hiking, and playing soccer with her kids.     Patient Stated Goal(s):  \"return to active lifestyle\"  Initial Pyain Level:       /10   Post Session Pain Level:

## 2024-04-29 NOTE — PROGRESS NOTES
Sona Wisdom  : 1980  Primary: [employer]  Secondary:  [facility] @ [place] [address] [place] [address]  Phone: [phone]  Fax: [phone] Plan Frequency: 1x/week    Plan of Care/Certification Expiration Date: 24        Plan of Care/Certification Expiration Date:  Plan of Care/Certification Expiration Date: 24    Frequency/Duration:   Plan Frequency: 1x/week      Time In/Out:   Time In: 0900  Time Out: 1000      PT Visit Info:    Progress Note Counter: 1      Visit Count:  12    OUTPATIENT PHYSICAL THERAPY:   Treatment Note 2024       Episode  (right hip labral repair)               Treatment Diagnosis:    Pain in right hip  Stiffness of right hip, not elsewhere classified  Difficulty in walking, not elsewhere classified  Medical/Referring Diagnosis:    Femoral acetabular impingement [M25.859]    Referring Physician:  Steven Rogers MD MD Orders:  PT Eval and Treat   Return MD Appt:  next week    Date of Onset:  Onset Date: 24     Allergies:   Amoxicillin and Cefaclor  Restrictions/Precautions:   Post Surgical Precautions: labral repair       Interventions Planned (Treatment may consist of any combination of the following):     See Assessment Note    Subjective Comments:   Sona reports that she continues to try to do more and more at home without the crutch but that her hip is still really sore by the end of the day and fatigued.   Initial Pain Level::     1/10  Post Session Pain Level:       2/10  Medications Last Reviewed:  2024  Updated Objective Findings:  See Recertification Note from today      Treatment   Manual Therapy (10  Minutes): Manual techniques to facilitate improved motion and decreased pain. (Used abbreviations: MET - muscle energy technique; PNF - proprioceptive neuromuscular facilitation; NMR - neuromuscular re-education; a/p - anterior to posterior; p/a - posterior to

## 2024-04-30 ENCOUNTER — HOSPITAL ENCOUNTER (OUTPATIENT)
Dept: PHYSICAL THERAPY | Age: 44
Setting detail: RECURRING SERIES
Discharge: HOME | End: 2024-05-03
Attending: ORTHOPAEDIC SURGERY
Payer: COMMERCIAL

## 2024-04-30 PROCEDURE — 97140 MANUAL THERAPY 1/> REGIONS: CPT

## 2024-04-30 PROCEDURE — 97110 THERAPEUTIC EXERCISES: CPT

## 2024-04-30 ASSESSMENT — PAIN SCALES - GENERAL: PAINLEVEL_OUTOF10: 1

## 2024-05-07 ENCOUNTER — HOSPITAL ENCOUNTER (OUTPATIENT)
Dept: PHYSICAL THERAPY | Age: 44
Setting detail: RECURRING SERIES
Discharge: HOME | End: 2024-05-10
Attending: ORTHOPAEDIC SURGERY
Payer: COMMERCIAL

## 2024-05-07 PROCEDURE — 97140 MANUAL THERAPY 1/> REGIONS: CPT

## 2024-05-07 PROCEDURE — 97110 THERAPEUTIC EXERCISES: CPT

## 2024-05-07 ASSESSMENT — PAIN SCALES - GENERAL: PAINLEVEL_OUTOF10: 1

## 2024-05-07 NOTE — PROGRESS NOTES
Sona Wisdom  : 1980  Primary: [employer]  Secondary:  [facility] @ [place] [address] [place] [address]  Phone: [phone]  Fax: [phone] Plan Frequency: 1x/week    Plan of Care/Certification Expiration Date: 24        Plan of Care/Certification Expiration Date:  Plan of Care/Certification Expiration Date: 24    Frequency/Duration:   Plan Frequency: 1x/week      Time In/Out:   Time In: 1000  Time Out: 1100      PT Visit Info:    Progress Note Counter: 2      Visit Count:  13    OUTPATIENT PHYSICAL THERAPY:   Treatment Note 2024       Episode  (right hip labral repair)               Treatment Diagnosis:    Pain in right hip  Stiffness of right hip, not elsewhere classified  Difficulty in walking, not elsewhere classified  Medical/Referring Diagnosis:    Femoral acetabular impingement [M25.859]    Referring Physician:  Steven Rogers MD MD Orders:  PT Eval and Treat   Return MD Appt:  next week    Date of Onset:  Onset Date: 24     Allergies:   Amoxicillin and Cefaclor  Restrictions/Precautions:   Post Surgical Precautions: labral repair       Interventions Planned (Treatment may consist of any combination of the following):     See Assessment Note    Subjective Comments:   Sona reports that she is really tight today.    Initial Pain Level::     1/10  Post Session Pain Level:       1/10  Medications Last Reviewed:  2024  Updated Objective Findings:  None Today      Treatment   Manual Therapy (13  Minutes): Manual techniques to facilitate improved motion and decreased pain. (Used abbreviations: MET - muscle energy technique; PNF - proprioceptive neuromuscular facilitation; NMR - neuromuscular re-education; a/p - anterior to posterior; p/a - posterior to anterior)   STM to quadriceps, hip flexors, TFL, adductors  STM to gluteals    Circumduction at neutral and 80 degrees   Manual ROM: log roll IR,

## 2024-05-08 NOTE — PROGRESS NOTES
I am accessing Ms. Wisdom's chart as a part of our department's internal chart auditing process.  I certify that Ms. Wisdom is, or was, a patient in our department.  Thank you,  Jc Gonzalez, PT  5/8/2024

## 2024-05-14 ENCOUNTER — APPOINTMENT (OUTPATIENT)
Dept: PHYSICAL THERAPY | Age: 44
End: 2024-05-14
Attending: ORTHOPAEDIC SURGERY
Payer: COMMERCIAL

## 2024-05-15 ENCOUNTER — APPOINTMENT (OUTPATIENT)
Dept: PHYSICAL THERAPY | Age: 44
End: 2024-05-15
Attending: ORTHOPAEDIC SURGERY
Payer: COMMERCIAL

## 2024-05-17 ENCOUNTER — HOSPITAL ENCOUNTER (OUTPATIENT)
Dept: PHYSICAL THERAPY | Age: 44
Setting detail: RECURRING SERIES
End: 2024-05-17
Attending: ORTHOPAEDIC SURGERY
Payer: COMMERCIAL

## 2024-05-17 NOTE — PROGRESS NOTES
Name: Sona Wisdom  YOB: 1980  Gender: female  MRN: [id number]    Procedure Performed:   1) right  Hip arthroscopy with labral repair  2) right Hip arthroscopy with Cam femoroplasty  3) right  hip arthroscopy with rim trimming acetabuloplasty     Date of Procedure: 2/28/24    Subjective: Returns 11 weeks status post the above procedure.  She states she is having some abductor pain but she is working with Elizabeth and physical therapy and has seen some improvement.  She states she went on a 2/3 mile walk with her dogs on Saturday and then felt it on Sunday.  She notes that it took her a year to run with her last hip surgery.  She states she is happy with her progress so far.    Physical Examination: Incisions clean dry and intact, no sign of infection. Motor and sensory intact.  Calf is soft nontender outpatient.  No pain with range of motion.  No pain with TYLER or FADIR today.  5 out of 5 strength with abduction and adduction strength.  Dorsalis base pulse 2+.    Assessment:   1. Status post orthopedic surgery, follow-up exam         Plan:   Doing well.   Continue PT per hip arthroscopy protocol.  We discussed that there are ups and downs and she will continue to see improvement over the next 3 to 6 months.  She would like Dr. Rogers to look at her MRI to evaluate her SI joint.  She states she feels like something is wrong with it at this point.  I will relay this message to him.  Follow up in 8  weeks

## 2024-05-17 NOTE — PROGRESS NOTES
Sona Wisdom  : 1980  Primary: [employer]  Secondary:  [facility] [place] [address]  [phone]   [phone]      DATE: 2024    Patient canceled for appointment today due to illness.  Will plan to follow up on next scheduled visit.      Elizabeth Wright, PT, DPT

## 2024-05-20 ENCOUNTER — OFFICE VISIT (OUTPATIENT)
Dept: ORTHOPEDIC SURGERY | Age: 44
End: 2024-05-20

## 2024-05-20 DIAGNOSIS — Z09 STATUS POST ORTHOPEDIC SURGERY, FOLLOW-UP EXAM: Primary | ICD-10-CM

## 2024-05-20 PROCEDURE — 99024 POSTOP FOLLOW-UP VISIT: CPT | Performed by: PHYSICIAN ASSISTANT

## 2024-05-21 ENCOUNTER — HOSPITAL ENCOUNTER (OUTPATIENT)
Dept: PHYSICAL THERAPY | Age: 44
Setting detail: RECURRING SERIES
End: 2024-05-21
Attending: ORTHOPAEDIC SURGERY
Payer: COMMERCIAL

## 2024-05-21 NOTE — PROGRESS NOTES
Sona Webster [place]andoorqu[place]  : 1980  Primary: [employer]  Secondary:  [facility] [place] [address]  [phone]   [phone]      DATE: 2024    Patient canceled for appointment today due to unknown reason.  Will plan to follow up on next scheduled visit.      Elizabeth Wright, PT, DPT

## 2024-06-04 ENCOUNTER — HOSPITAL ENCOUNTER (OUTPATIENT)
Dept: PHYSICAL THERAPY | Age: 44
Setting detail: RECURRING SERIES
Discharge: HOME | End: 2024-06-07
Attending: ORTHOPAEDIC SURGERY
Payer: COMMERCIAL

## 2024-06-04 PROCEDURE — 97110 THERAPEUTIC EXERCISES: CPT

## 2024-06-04 PROCEDURE — 97140 MANUAL THERAPY 1/> REGIONS: CPT

## 2024-06-04 ASSESSMENT — PAIN SCALES - GENERAL: PAINLEVEL_OUTOF10: 2

## 2024-06-04 NOTE — PROGRESS NOTES
Sona Wisdom  : 1980  Primary: [employer]  Secondary:  [facility] @ [place] [address] [address] [place] [address]  Phone: [phone]  Fax: [phone] Plan Frequency: 1x/week    Plan of Care/Certification Expiration Date: 24        Plan of Care/Certification Expiration Date:  Plan of Care/Certification Expiration Date: 24    Frequency/Duration:   Plan Frequency: 1x/week      Time In/Out:   Time In: 0900  Time Out: 1000      PT Visit Info:    Progress Note Counter: 2      Visit Count:  14    OUTPATIENT PHYSICAL THERAPY:   Treatment Note 2024       Episode  (right hip labral repair)               Treatment Diagnosis:    Pain in right hip  Stiffness of right hip, not elsewhere classified  Difficulty in walking, not elsewhere classified  Medical/Referring Diagnosis:    Femoral acetabular impingement [M25.859]    Referring Physician:  Steven Rogers MD MD Orders:  PT Eval and Treat   Return MD Appt:  next week    Date of Onset:  Onset Date: 24     Allergies:   Amoxicillin and Cefaclor  Restrictions/Precautions:   Post Surgical Precautions: labral repair       Interventions Planned (Treatment may consist of any combination of the following):     See Assessment Note    Subjective Comments:   Sona reports that she did well with traveling and while she was sick.  She reports that she has mostly transitioned off of her crutch.  She reports that her sacrum seems to bother her and limit her with exercises more than her hip does.    Initial Pain Level::     2/10  Post Session Pain Level:       2/10  Medications Last Reviewed:  2024  Updated Objective Findings:  None Today      Treatment   Manual Therapy (13  Minutes): Manual techniques to facilitate improved motion and decreased pain. (Used abbreviations: MET - muscle energy technique; PNF - proprioceptive neuromuscular facilitation; NMR - neuromuscular re-education; a/p -

## 2024-06-11 ENCOUNTER — HOSPITAL ENCOUNTER (OUTPATIENT)
Dept: PHYSICAL THERAPY | Age: 44
Setting detail: RECURRING SERIES
Discharge: HOME | End: 2024-06-14
Attending: ORTHOPAEDIC SURGERY
Payer: COMMERCIAL

## 2024-06-11 PROCEDURE — 97110 THERAPEUTIC EXERCISES: CPT

## 2024-06-11 PROCEDURE — 97140 MANUAL THERAPY 1/> REGIONS: CPT

## 2024-06-11 ASSESSMENT — PAIN SCALES - GENERAL: PAINLEVEL_OUTOF10: 2

## 2024-06-11 NOTE — PROGRESS NOTES
Sona Wisdom  : 1980  Primary: [employer]  Secondary:  [facility] @ [place] [address] [place] [address]  Phone: [phone]  Fax: [phone] Plan Frequency: 1x/week    Plan of Care/Certification Expiration Date: 24        Plan of Care/Certification Expiration Date:  Plan of Care/Certification Expiration Date: 24    Frequency/Duration:   Plan Frequency: 1x/week      Time In/Out:   Time In: 0801  Time Out: 0900      PT Visit Info:    Progress Note Counter: 4      Visit Count:  15    OUTPATIENT PHYSICAL THERAPY:   Treatment Note 2024       Episode  (right hip labral repair)               Treatment Diagnosis:    Pain in right hip  Stiffness of right hip, not elsewhere classified  Difficulty in walking, not elsewhere classified  Medical/Referring Diagnosis:    Femoral acetabular impingement [M25.859]    Referring Physician:  Steven Rogers MD MD Orders:  PT Eval and Treat   Return MD Appt:  next week    Date of Onset:  Onset Date: 24     Allergies:   Amoxicillin and Cefaclor  Restrictions/Precautions:   Post Surgical Precautions: labral repair       Interventions Planned (Treatment may consist of any combination of the following):     See Assessment Note    Subjective Comments:   Sona reports that she can tell that she is getting stronger but that she still has a lot of sacral pain when she does isolated hip strengthening exercises.  She reports that her left knee and ITB are bothering her as well, possibly from limping.    Initial Pain Level::     2/10  Post Session Pain Level:       2/10  Medications Last Reviewed:  2024  Updated Objective Findings:  None Today      Treatment   Manual Therapy (23  Minutes): Manual techniques to facilitate improved motion and decreased pain. (Used abbreviations: MET - muscle energy technique; PNF - proprioceptive neuromuscular facilitation; NMR - neuromuscular

## 2024-06-14 ENCOUNTER — HOSPITAL ENCOUNTER (OUTPATIENT)
Dept: PHYSICAL THERAPY | Age: 44
Setting detail: RECURRING SERIES
Discharge: HOME | End: 2024-06-17
Attending: ORTHOPAEDIC SURGERY
Payer: COMMERCIAL

## 2024-06-14 PROCEDURE — 97140 MANUAL THERAPY 1/> REGIONS: CPT

## 2024-06-14 PROCEDURE — 97110 THERAPEUTIC EXERCISES: CPT

## 2024-06-14 ASSESSMENT — PAIN SCALES - GENERAL: PAINLEVEL_OUTOF10: 2

## 2024-06-14 NOTE — PROGRESS NOTES
Sona Wisdom  : 1980  Primary: [employer]  Secondary:  [facility] @ [place] [address] [place] [address]  Phone: [phone]  Fax: [phone] Plan Frequency: 1x/week    Plan of Care/Certification Expiration Date: 24        Plan of Care/Certification Expiration Date:  Plan of Care/Certification Expiration Date: 24    Frequency/Duration:   Plan Frequency: 1x/week      Time In/Out:   Time In: 0900  Time Out: 1000      PT Visit Info:    Progress Note Counter: 5      Visit Count:  16    OUTPATIENT PHYSICAL THERAPY:   Treatment Note 2024       Episode  (right hip labral repair)               Treatment Diagnosis:    Pain in right hip  Stiffness of right hip, not elsewhere classified  Difficulty in walking, not elsewhere classified  Medical/Referring Diagnosis:    Femoral acetabular impingement [M25.859]    Referring Physician:  Steven Rogers MD MD Orders:  PT Eval and Treat   Return MD Appt:  next week    Date of Onset:  Onset Date: 24     Allergies:   Amoxicillin and Cefaclor  Restrictions/Precautions:   Post Surgical Precautions: labral repair       Interventions Planned (Treatment may consist of any combination of the following):     See Assessment Note    Subjective Comments:   Sona reports that she could tell that she benefited from dry needling last visit and that working on her low back soft tissue has seemed to give her some relief from sacral pain.    Initial Pain Level::     2/10  Post Session Pain Level:       2/10  Medications Last Reviewed:  2024  Updated Objective Findings:  None Today      Treatment   Manual Therapy (23  Minutes): Manual techniques to facilitate improved motion and decreased pain. (Used abbreviations: MET - muscle energy technique; PNF - proprioceptive neuromuscular facilitation; NMR - neuromuscular re-education; a/p - anterior to posterior; p/a - posterior to anterior)

## 2024-06-18 ENCOUNTER — HOSPITAL ENCOUNTER (OUTPATIENT)
Dept: PHYSICAL THERAPY | Age: 44
Setting detail: RECURRING SERIES
End: 2024-06-18
Attending: ORTHOPAEDIC SURGERY
Payer: COMMERCIAL

## 2024-06-21 ENCOUNTER — HOSPITAL ENCOUNTER (OUTPATIENT)
Dept: PHYSICAL THERAPY | Age: 44
Setting detail: RECURRING SERIES
Discharge: HOME | End: 2024-06-24
Attending: ORTHOPAEDIC SURGERY
Payer: COMMERCIAL

## 2024-06-21 PROCEDURE — 97140 MANUAL THERAPY 1/> REGIONS: CPT

## 2024-06-21 PROCEDURE — 97110 THERAPEUTIC EXERCISES: CPT

## 2024-06-21 ASSESSMENT — PAIN SCALES - GENERAL: PAINLEVEL_OUTOF10: 2

## 2024-06-21 NOTE — PROGRESS NOTES
Sona Wisdom  : 1980  Primary: [employer]  Secondary:  [facility] @ [place] [address] [address] [place] [address]  Phone: [phone]  Fax: [phone] Plan Frequency: 1x/week    Plan of Care/Certification Expiration Date: 24        Plan of Care/Certification Expiration Date:  Plan of Care/Certification Expiration Date: 24    Frequency/Duration:   Plan Frequency: 1x/week      Time In/Out:   Time In: 0900  Time Out: 1000      PT Visit Info:    Progress Note Counter: 7      Visit Count:  17    OUTPATIENT PHYSICAL THERAPY:   Treatment Note 2024       Episode  (right hip labral repair)               Treatment Diagnosis:    Pain in right hip  Stiffness of right hip, not elsewhere classified  Difficulty in walking, not elsewhere classified  Medical/Referring Diagnosis:    Femoral acetabular impingement [M25.859]    Referring Physician:  Steven Rogers MD MD Orders:  PT Eval and Treat   Return MD Appt:  next week    Date of Onset:  Onset Date: 24     Allergies:   Amoxicillin and Cefaclor  Restrictions/Precautions:   Post Surgical Precautions: labral repair       Interventions Planned (Treatment may consist of any combination of the following):     See Assessment Note    Subjective Comments:   Sona reports that her sacral pain has improved with dry needling lumbar spine but that she tightened back up over time.     Initial Pain Level::     2/10  Post Session Pain Level:       1/10  Medications Last Reviewed:  2024  Updated Objective Findings:  None Today      Treatment   Manual Therapy (23  Minutes): Manual techniques to facilitate improved motion and decreased pain. (Used abbreviations: MET - muscle energy technique; PNF - proprioceptive neuromuscular facilitation; NMR - neuromuscular re-education; a/p - anterior to posterior; p/a - posterior to anterior)   STM to quadriceps, hip flexors, TFL, adductors  STM to

## 2024-06-25 ENCOUNTER — HOSPITAL ENCOUNTER (OUTPATIENT)
Dept: PHYSICAL THERAPY | Age: 44
Setting detail: RECURRING SERIES
Discharge: HOME | End: 2024-06-28
Attending: ORTHOPAEDIC SURGERY
Payer: COMMERCIAL

## 2024-06-25 PROCEDURE — 97140 MANUAL THERAPY 1/> REGIONS: CPT

## 2024-06-25 PROCEDURE — 97110 THERAPEUTIC EXERCISES: CPT

## 2024-06-25 ASSESSMENT — PAIN SCALES - GENERAL: PAINLEVEL_OUTOF10: 2

## 2024-06-26 NOTE — PROGRESS NOTES
Sona Wisdom  : 1980  Primary: [employer]  Secondary:  [facility] @ [place] [address] [address] [place] [address]  Phone: [phone]  Fax: [phone] Plan Frequency: 1x/week    Plan of Care/Certification Expiration Date: 24        Plan of Care/Certification Expiration Date:  Plan of Care/Certification Expiration Date: 24    Frequency/Duration:   Plan Frequency: 1x/week      Time In/Out:   Time In: 0900  Time Out: 1000      PT Visit Info:    Progress Note Counter: 8      Visit Count:  18    OUTPATIENT PHYSICAL THERAPY:   Treatment Note 2024       Episode  (right hip labral repair)               Treatment Diagnosis:    Pain in right hip  Stiffness of right hip, not elsewhere classified  Difficulty in walking, not elsewhere classified  Medical/Referring Diagnosis:    Femoral acetabular impingement [M25.859]    Referring Physician:  Steven Rogers MD MD Orders:  PT Eval and Treat   Return MD Appt:  next week    Date of Onset:  Onset Date: 24     Allergies:   Amoxicillin and Cefaclor  Restrictions/Precautions:   Post Surgical Precautions: labral repair       Interventions Planned (Treatment may consist of any combination of the following):     See Assessment Note    Subjective Comments:   Sona reports that her right hip is just a little sore from activity, but that her left ITB and sacrum are more of a problem.      Initial Pain Level::     2/10  Post Session Pain Level:       1/10  Medications Last Reviewed:  2024  Updated Objective Findings:  None Today      Treatment   Manual Therapy (40  Minutes): Manual techniques to facilitate improved motion and decreased pain. (Used abbreviations: MET - muscle energy technique; PNF - proprioceptive neuromuscular facilitation; NMR - neuromuscular re-education; a/p - anterior to posterior; p/a - posterior to anterior)   STM to quadriceps, hip flexors, TFL, adductors  STM

## 2024-06-28 ENCOUNTER — HOSPITAL ENCOUNTER (OUTPATIENT)
Dept: PHYSICAL THERAPY | Age: 44
Setting detail: RECURRING SERIES
Discharge: HOME | End: 2024-07-01
Attending: ORTHOPAEDIC SURGERY
Payer: COMMERCIAL

## 2024-06-28 PROCEDURE — 97140 MANUAL THERAPY 1/> REGIONS: CPT

## 2024-06-28 PROCEDURE — 97110 THERAPEUTIC EXERCISES: CPT

## 2024-06-28 ASSESSMENT — PAIN SCALES - GENERAL: PAINLEVEL_OUTOF10: 3

## 2024-06-28 NOTE — PROGRESS NOTES
Sona Wisdom  : 1980  Primary: [employer]  Secondary:  [facility] @ [place] [address] [address] [place] [place] [address]  Phone: [phone]  Fax: [phone] Plan Frequency: 1x/week    Plan of Care/Certification Expiration Date: 24        Plan of Care/Certification Expiration Date:  Plan of Care/Certification Expiration Date: 24    Frequency/Duration:   Plan Frequency: 1x/week      Time In/Out:   Time In: 0900  Time Out: 1000      PT Visit Info:    Progress Note Counter: 9      Visit Count:  19    OUTPATIENT PHYSICAL THERAPY:   Treatment Note 2024       Episode  (right hip labral repair)               Treatment Diagnosis:    Pain in right hip  Stiffness of right hip, not elsewhere classified  Difficulty in walking, not elsewhere classified  Medical/Referring Diagnosis:    Femoral acetabular impingement [M25.859]    Referring Physician:  Steven Rogers MD MD Orders:  PT Eval and Treat   Return MD Appt:  next week    Date of Onset:  Onset Date: 24     Allergies:   Amoxicillin and Cefaclor  Restrictions/Precautions:   Post Surgical Precautions: labral repair       Interventions Planned (Treatment may consist of any combination of the following):     See Assessment Note    Subjective Comments:   Sona reports that her left hip and knee are hurting more than anything.  She reports that she feels like she is limping on her left leg now.  She reports pain at mid stance and especially at terminal stance on left.  She reports right his is okay.     Initial Pain Level::     3/10  Post Session Pain Level:       2/10  Medications Last Reviewed:  2024  Updated Objective Findings:  None Today      Treatment   Manual Therapy (40  Minutes): Manual techniques to facilitate improved motion and decreased pain. (Used abbreviations: MET - muscle energy technique; PNF - proprioceptive neuromuscular facilitation; NMR - neuromuscular

## 2024-07-02 ENCOUNTER — HOSPITAL ENCOUNTER (OUTPATIENT)
Dept: PHYSICAL THERAPY | Age: 44
Setting detail: RECURRING SERIES
Discharge: HOME | End: 2024-07-05
Attending: ORTHOPAEDIC SURGERY
Payer: COMMERCIAL

## 2024-07-02 PROCEDURE — 97110 THERAPEUTIC EXERCISES: CPT

## 2024-07-02 ASSESSMENT — PAIN SCALES - GENERAL: PAINLEVEL_OUTOF10: 1

## 2024-07-02 NOTE — PROGRESS NOTES
post-treatment care including hydration and mobility.     Treatment/Session Summary:    Treatment Assessment:   Sona did well with strengthening program today.  She continues to have left knee pain, but will likely improve when right hip strength normalizes and remaining gait deficit improves, so treatment is shifting to progressing strength.         Communication/Consultation:  None today  Equipment provided today:  None  Recommendations/Intent for next treatment session: Next visit will focus on progressing mobility and lower quarter strength.     >Total Treatment Billable Duration:  53 minutes   Time In: 0800  Time Out: 0900    Elizabeth Wright, PT         Charge Capture  [facility]  Appt Desk     Future Appointments   Date Time Provider Department Center   7/9/2024  8:00 AM Elizabeth Wright, PT [facility]   7/11/2024  8:00 AM Elizabeth Wright, PT [facility]   7/22/2024  8:10 AM Steven Rogers MD [facility]     PHASE I   Weeks 0 - 2   Flat foot 25% weight bearing   External rotation limited to 20°, flexion limited to 90°   Supine hip log rolling for IR   Sustained stretching for psoas   Stationary bike, stool rotations for IR only, supine bridges, pelvic tilts, hip isometric (no flexion)      PHASE II   Weeks 3 - 4   Progress WB to full by end of week 4   Increase ER at week 4   Bent knee fall outs begin   Stool rotations for ER begin week 4   Emphasize glute muscle activation   Clam shells, step downs, hip hiking   Aquatic therapy may begin week 4      PHASE III   Weeks 5 - 8   ROM as tolerated, full WB   Standing BAPS rotation   ER with TYLER   Hip joint mobs with mobilization belt   Progress strengthening/closed chain exercises   Balance and proprioception      PHASE IV   Weeks 9+   Endurance activities around hip   Dynamic and advanced strengthening   Plyometric and agility starting week 12   Treadmill running starting week 12

## 2024-07-09 ENCOUNTER — HOSPITAL ENCOUNTER (OUTPATIENT)
Dept: PHYSICAL THERAPY | Age: 44
Setting detail: RECURRING SERIES
End: 2024-07-09
Attending: ORTHOPAEDIC SURGERY
Payer: COMMERCIAL

## 2024-07-10 ENCOUNTER — APPOINTMENT (OUTPATIENT)
Dept: PHYSICAL THERAPY | Age: 44
End: 2024-07-10
Attending: ORTHOPAEDIC SURGERY
Payer: COMMERCIAL

## 2024-07-11 ENCOUNTER — APPOINTMENT (OUTPATIENT)
Dept: PHYSICAL THERAPY | Age: 44
End: 2024-07-11
Attending: ORTHOPAEDIC SURGERY
Payer: COMMERCIAL

## 2024-07-16 ENCOUNTER — HOSPITAL ENCOUNTER (OUTPATIENT)
Dept: PHYSICAL THERAPY | Age: 44
Setting detail: RECURRING SERIES
Discharge: HOME | End: 2024-07-19
Attending: ORTHOPAEDIC SURGERY
Payer: COMMERCIAL

## 2024-07-16 PROCEDURE — 97140 MANUAL THERAPY 1/> REGIONS: CPT

## 2024-07-16 PROCEDURE — 97110 THERAPEUTIC EXERCISES: CPT

## 2024-07-16 ASSESSMENT — PAIN SCALES - GENERAL: PAINLEVEL_OUTOF10: 2

## 2024-07-16 NOTE — THERAPY RECERTIFICATION
2/10  Past Medical History/Comorbidities:   Ms. Wisdom  has a past medical history of Family history of hemophilia, Femoral acetabular impingement, History of IUFD, Postpartum depression, Raynaud disease, Rh negative status during pregnancy, Tear of right acetabular labrum, initial encounter, and Trauma.  Ms. Wisdom  has a past surgical history that includes hip surgery (Left, 2018); Colonoscopy; and hip surgery (Right, 2/28/2024).  Social History/Living Environment:   Patient lives with their family  Type of Home: House: Two Story and bedroom on main floor     Prior Level of Function/Work/Activity:   Prior Level of Function: Independent     Very active with walking, running and generally active lifestyle   Learning:   Does the patient/guardian have any barriers to learning?: No barriers  Will there be a co-learner?: No  What is the preferred language of the patient/guardian?: English  Is an  required?: No  How does the patient/guardian prefer to learn new concepts?: Listening; Reading; Demonstration; Pictures/Videos    Fall Risk Scale:   Abernathy Total Score: 15    Personal Factors: had labral repair on right side previously; recent L5 lumbar issues   Assessment & Plan    OBJECTIVE   Observation/Orthostatic Postural Assessment/Palpation:          Incisions well healed.  Mild scar tissue palpable about the incision sites.  Hypersensitivity at the incision sites--improved compared to progress note, mild.      ROM:        Left  Right    ER 50 60   IR  40  35   Flexion  135 120     Strength:             Left  Right    Hip flexion 4+ 4   Hip abduction 4+ 4   Hip extension 4+ 4   Knee flexion  4+ 4+   Knee extension 4+ 4 +       Functional Mobility:         Gait/Ambulation:  Pt ambulates with no AD and no deviation.          Stairs:  reciprocal         Overhead Squat: DP         Single Limb Squat: trunk lean to the right, occasional dynamic valgus on right         Balance:          Good     ASSESSMENT   Initial

## 2024-07-16 NOTE — PROGRESS NOTES
Sona Wisdom  : 1980  Primary: [employer]  Secondary:  [facility] [place] @ [place] [address] [address] [place] [address]  Phone: [phone]  Fax: [phone] Plan Frequency: 2x/week    Plan of Care/Certification Expiration Date: 24        Plan of Care/Certification Expiration Date:  Plan of Care/Certification Expiration Date: 24    Frequency/Duration:   Plan Frequency: 2x/week      Time In/Out:   Time In: 0800  Time Out: 0900      PT Visit Info:    Progress Note Counter: 0      Visit Count:  21    OUTPATIENT PHYSICAL THERAPY:   Treatment Note 2024       Episode  (right hip labral repair)               Treatment Diagnosis:    Pain in right hip  Stiffness of right hip, not elsewhere classified  Difficulty in walking, not elsewhere classified  Medical/Referring Diagnosis:    Femoral acetabular impingement [M25.859]    Referring Physician:  Steven Rogers MD MD Orders:  PT Eval and Treat   Return MD Appt:  next week    Date of Onset:  Onset Date: 24     Allergies:   Amoxicillin and Cefaclor  Restrictions/Precautions:   Post Surgical Precautions: labral repair       Interventions Planned (Treatment may consist of any combination of the following):     See Assessment Note    Subjective Comments:   Soan reports that she can tell gradual progress in her symptoms overall and that she is having less and less knee pain, hip pain, and sacral pain/catching.     Initial Pain Level::     2/10  Post Session Pain Level:       2/10  Medications Last Reviewed:  2024  Updated Objective Findings:  None Today      Treatment   Manual Therapy (8  Minutes): Manual techniques to facilitate improved motion and decreased pain. (Used abbreviations: MET - muscle energy technique; PNF - proprioceptive neuromuscular facilitation; NMR - neuromuscular re-education; a/p - anterior to posterior; p/a - posterior to anterior)   STM to quadriceps, hip

## 2024-07-22 ENCOUNTER — OFFICE VISIT (OUTPATIENT)
Dept: ORTHOPEDIC SURGERY | Age: 44
End: 2024-07-22
Payer: COMMERCIAL

## 2024-07-22 ENCOUNTER — TELEPHONE (OUTPATIENT)
Dept: ORTHOPEDIC SURGERY | Age: 44
End: 2024-07-22

## 2024-07-22 DIAGNOSIS — Z09 STATUS POST ORTHOPEDIC SURGERY, FOLLOW-UP EXAM: ICD-10-CM

## 2024-07-22 DIAGNOSIS — G89.29 CHRONIC LEFT SI JOINT PAIN: Primary | ICD-10-CM

## 2024-07-22 DIAGNOSIS — M53.3 CHRONIC LEFT SI JOINT PAIN: Primary | ICD-10-CM

## 2024-07-22 PROCEDURE — 99213 OFFICE O/P EST LOW 20 MIN: CPT | Performed by: ORTHOPAEDIC SURGERY

## 2024-07-22 NOTE — PROGRESS NOTES
Name: Sona Wisdom  YOB: 1980  Gender: female  MRN: [id number]      CC: Hip Pain       HPI: Sona Wisdom is a 43 y.o. female who returns for follow up on her left hip. She is doing well. Her physical therapist has been working on dry needling her thoracic spine and left hip.  She continues to complain of pain in her SI joint posteriorly as well.  Right hip doing well otherwise.         Physical Examination:  General: no acute distress  Lungs: breathing easily  CV: regular rhythm by pulse  Right hip: Incisions are well-healed she has minimal discomfort with logroll internal/external rotation.  Negative impingement signs.  Left hip she does have some tenderness palpation of the TFL and anterolateral aspect of her hip.  She has negative impingement signs with some mild anterolateral pain.  She does have some SI joint pain positive Edy and Gaenslen's test and pain with figure-of-four and pain with extension of the left hip when side-lying over the SI joint that refers over to the right side.        Assessment:     ICD-10-CM    1. Chronic left SI joint pain  M53.3     G89.29       2. Status post orthopedic surgery, follow-up exam  Z09           Plan:   The right hip is doing really well.  I think she has some generalized dysfunction in her spine as well as pelvis.  I do not think this is intra-articular based symptoms.  I do think there is potential from her left SI joint and we may be able to benefit from an intra-articular left SI joint injection.  We discussed pros and cons of this.  She would like to think about this.  If she would like to proceed with this we will have her return for follow-up with Dr. Angela Barney to perform this under ultrasound guidance.  Otherwise continue physical therapy and I can see her back as needed for her hip depending on her progress            Steven Rogers MD, FAAOS  Orthopaedics and Sports Medicine

## 2024-08-06 ENCOUNTER — APPOINTMENT (OUTPATIENT)
Dept: PHYSICAL THERAPY | Age: 44
End: 2024-08-06
Attending: ORTHOPAEDIC SURGERY
Payer: COMMERCIAL

## 2024-08-09 ENCOUNTER — HOSPITAL ENCOUNTER (OUTPATIENT)
Dept: PHYSICAL THERAPY | Age: 44
Setting detail: RECURRING SERIES
End: 2024-08-09
Attending: ORTHOPAEDIC SURGERY
Payer: COMMERCIAL

## 2024-08-13 ENCOUNTER — APPOINTMENT (OUTPATIENT)
Dept: PHYSICAL THERAPY | Age: 44
End: 2024-08-13
Attending: ORTHOPAEDIC SURGERY
Payer: COMMERCIAL

## 2024-08-16 ENCOUNTER — HOSPITAL ENCOUNTER (OUTPATIENT)
Dept: PHYSICAL THERAPY | Age: 44
Setting detail: RECURRING SERIES
Discharge: HOME | End: 2024-08-19
Attending: ORTHOPAEDIC SURGERY
Payer: COMMERCIAL

## 2024-08-16 PROCEDURE — 97140 MANUAL THERAPY 1/> REGIONS: CPT

## 2024-08-16 ASSESSMENT — PAIN SCALES - GENERAL: PAINLEVEL_OUTOF10: 6

## 2024-08-16 NOTE — PROGRESS NOTES
Sona Wisdom  : 1980  Primary: [employer]  Secondary:  [facility] @ [place]  [address] [place] [address]  Phone: [phone]  Fax: [phone] Plan Frequency: 2x/week    Plan of Care/Certification Expiration Date: 09/15/24        Plan of Care/Certification Expiration Date:  Plan of Care/Certification Expiration Date: 09/15/24    Frequency/Duration:   Plan Frequency: 2x/week      Time In/Out:   Time In: 0900  Time Out: 1000      PT Visit Info:    Progress Note Counter: 1      Visit Count:  22    OUTPATIENT PHYSICAL THERAPY:   Treatment Note 2024       Episode  (right hip labral repair)               Treatment Diagnosis:    Pain in right hip  Stiffness of right hip, not elsewhere classified  Difficulty in walking, not elsewhere classified  Medical/Referring Diagnosis:    Femoral acetabular impingement [M25.859]    Referring Physician:  Steven Rogers MD MD Orders:  PT Eval and Treat   Return MD Appt:  next week    Date of Onset:  Onset Date: 24     Allergies:   Amoxicillin and Cefaclor  Restrictions/Precautions:   Post Surgical Precautions: labral repair       Interventions Planned (Treatment may consist of any combination of the following):     See Assessment Note    Subjective Comments:   Sona reports that she has been in a lot of pain.  She reports that she is having pain down both legs, SIJ, and up her back that is constant but worsens as the day progresses.  She reports that standing and carrying items seem to worsen her pain and that she has pain whether she is active or sedentary.  She reports that she is struggling with the amount of pain she is having and that she can't find position or activity that provides relief.  She reports that she is having a SIJ injection next week.     Initial Pain Level::     6/10  Post Session Pain Level:       5/10  Medications Last Reviewed:  2024  Updated Objective

## 2024-08-19 ENCOUNTER — HOSPITAL ENCOUNTER (OUTPATIENT)
Dept: PHYSICAL THERAPY | Age: 44
Setting detail: RECURRING SERIES
Discharge: HOME | End: 2024-08-22
Attending: ORTHOPAEDIC SURGERY
Payer: COMMERCIAL

## 2024-08-19 PROCEDURE — 97140 MANUAL THERAPY 1/> REGIONS: CPT

## 2024-08-19 ASSESSMENT — PAIN SCALES - GENERAL: PAINLEVEL_OUTOF10: 5

## 2024-08-19 NOTE — PROGRESS NOTES
decrease in pain levels and improved muscle tone and play.    Dry Needles Used: 12   Dry Needles Removed: 12     Patient has been educated with post-treatment care including hydration and mobility.     Treatment/Session Summary:    Treatment Assessment:   Sona tolerated more deep pressure with manual techniques today and tolerated increased quantity of needles for pain management.          Communication/Consultation:  None today  Equipment provided today:  None  Recommendations/Intent for next treatment session: Next visit will focus on progressing mobility and lower quarter strength.     >Total Treatment Billable Duration:  53 minutes   Time In: 1000  Time Out: 1100    Elizabeth Wright, PT         Charge Capture  [facility] Portal  Appt Desk     Future Appointments   Date Time Provider Department Center   8/22/2024  9:00 AM Elizabeth Wright, PT [facility]   8/22/2024 11:00 AM Angela Barney MD [facility]   8/26/2024 10:00 AM Elizabeth Wright, PT [facility]   8/29/2024  9:00 AM Elizabeth Wright, PT [facility]     PHASE I   Weeks 0 - 2   Flat foot 25% weight bearing   External rotation limited to 20°, flexion limited to 90°   Supine hip log rolling for IR   Sustained stretching for psoas   Stationary bike, stool rotations for IR only, supine bridges, pelvic tilts, hip isometric (no flexion)      PHASE II   Weeks 3 - 4   Progress WB to full by end of week 4   Increase ER at week 4   Bent knee fall outs begin   Stool rotations for ER begin week 4   Emphasize glute muscle activation   Clam shells, step downs, hip hiking   Aquatic therapy may begin week 4      PHASE III   Weeks 5 - 8   ROM as tolerated, full WB   Standing BAPS rotation   ER with TYLER   Hip joint mobs with mobilization belt   Progress strengthening/closed chain exercises   Balance and proprioception      PHASE IV   Weeks 9+   Endurance activities around hip   Dynamic and advanced strengthening   Plyometric and agility starting week 12   Treadmill running

## 2024-08-21 ENCOUNTER — PATIENT MESSAGE (OUTPATIENT)
Dept: ORTHOPEDIC SURGERY | Age: 44
End: 2024-08-21

## 2024-08-22 ENCOUNTER — OFFICE VISIT (OUTPATIENT)
Dept: ORTHOPEDIC SURGERY | Age: 44
End: 2024-08-22

## 2024-08-22 DIAGNOSIS — G89.29 CHRONIC RIGHT SI JOINT PAIN: Primary | ICD-10-CM

## 2024-08-22 DIAGNOSIS — M53.3 CHRONIC RIGHT SI JOINT PAIN: Primary | ICD-10-CM

## 2024-08-22 RX ORDER — METHYLPREDNISOLONE ACETATE 40 MG/ML
40 INJECTION, SUSPENSION INTRA-ARTICULAR; INTRALESIONAL; INTRAMUSCULAR; SOFT TISSUE ONCE
Status: COMPLETED | OUTPATIENT
Start: 2024-08-22 | End: 2024-08-22

## 2024-08-22 RX ADMIN — METHYLPREDNISOLONE ACETATE 40 MG: 40 INJECTION, SUSPENSION INTRA-ARTICULAR; INTRALESIONAL; INTRAMUSCULAR; SOFT TISSUE at 11:55

## 2024-08-23 ENCOUNTER — HOSPITAL ENCOUNTER (OUTPATIENT)
Dept: PHYSICAL THERAPY | Age: 44
Setting detail: RECURRING SERIES
Discharge: HOME | End: 2024-08-26
Attending: ORTHOPAEDIC SURGERY
Payer: COMMERCIAL

## 2024-08-23 PROCEDURE — 97140 MANUAL THERAPY 1/> REGIONS: CPT

## 2024-08-23 PROCEDURE — 97110 THERAPEUTIC EXERCISES: CPT

## 2024-08-23 ASSESSMENT — PAIN SCALES - GENERAL: PAINLEVEL_OUTOF10: 5

## 2024-08-23 NOTE — PROGRESS NOTES
Sona Wisdom  : 1980  Primary: [employer]  Secondary:  [facility] @ [place] [address] [place] [address]  Phone: [phone]  Fax: [phone] Plan Frequency: 2x/week    Plan of Care/Certification Expiration Date: 09/15/24        Plan of Care/Certification Expiration Date:  Plan of Care/Certification Expiration Date: 09/15/24    Frequency/Duration:   Plan Frequency: 2x/week      Time In/Out:   Time In: 1100  Time Out: 1200      PT Visit Info:    Progress Note Counter: 3      Visit Count:  24    OUTPATIENT PHYSICAL THERAPY:   Treatment Note 2024       Episode  (right hip labral repair)               Treatment Diagnosis:    Pain in right hip  Stiffness of right hip, not elsewhere classified  Difficulty in walking, not elsewhere classified  Medical/Referring Diagnosis:    Femoral acetabular impingement [M25.859]    Referring Physician:  Steven Rogers MD MD Orders:  PT Eval and Treat   Return MD Appt:  next week    Date of Onset:  Onset Date: 24     Allergies:   Amoxicillin and Cefaclor  Restrictions/Precautions:   Post Surgical Precautions: labral repair       Interventions Planned (Treatment may consist of any combination of the following):     See Assessment Note    Subjective Comments:   Sona reports that she had a little relief from needling in her back, but soreness from needling her hips.  She reports no significant change in pain with SIJ injection yet, but that she's still optimistic.   She reports that she is really tight up her whole left side of her back.     Initial Pain Level::     5/10  Post Session Pain Level:       5/10  Medications Last Reviewed:  2024  Updated Objective Findings:  None Today      Treatment   Manual Therapy (45  Minutes): Manual techniques to facilitate improved motion and decreased pain. (Used abbreviations: MET - muscle energy technique; PNF - proprioceptive neuromuscular  starting week 12

## 2024-08-23 NOTE — PROGRESS NOTES
Name: Sona Wisdom  YOB: 1980  Gender: female  MRN: [id number]  Date of Encounter:  8/22/2024       CHIEF COMPLAINT:     Chief Complaint   Patient presents with    Injections     Right SI Joint        SUBJECTIVE/OBJECTIVE:      HPI:    Patient is a 44 y.o. pleasant female who presents today for an injection of the right SI joint.    Patient was referred by Dr. Rogers for SI joint injection.  She has been working with physical therapy post hip impingement surgery and has had right SI joint pain.      ASSESSMENT/PLAN:     Encounter Diagnosis   Name Primary?    Chronic right SI joint pain Yes        We discussed right SI joint  injection today, risks and benefits of injection including pain with injection, bleeding, damage to surrounding structures, infection, elevation in blood glucose, steroid flare. They wished to proceed with injection today and this was performed.      Orders Placed This Encounter    US ARTHR/ASP/INJ MAJOR JNT/BURSA RIGHT     Standing Status:   Future     Number of Occurrences:   1     Standing Expiration Date:   8/22/2025    methylPREDNISolone acetate (DEPO-MEDROL) injection 40 mg        No follow-ups on file.     Angela Barney MD  [facility]      PROCEDURE NOTE:     Right SI Injection - Ultrasound-guided     An injection of corticosteroid was discussed today and is indicated for patient’s SI pain today. All risks and benefits were discussed and patient wishes to proceed. Prior to proceeding forward with the injections to the right SI, risks and benefits were discussed.  Risks including infection, bleeding, nerve damage, and pain at the site of injection were discussed at length with the patient. Benefits including pain relief were also discussed with the patient. Patient verbalizes understanding of these and agrees to procedure.       The patient was positioned prone. A medial to lateral approach was utilized for this injection procedure. The site of

## 2024-08-26 ENCOUNTER — HOSPITAL ENCOUNTER (OUTPATIENT)
Dept: PHYSICAL THERAPY | Age: 44
Setting detail: RECURRING SERIES
Discharge: HOME | End: 2024-08-29
Attending: ORTHOPAEDIC SURGERY
Payer: COMMERCIAL

## 2024-08-26 PROCEDURE — 97110 THERAPEUTIC EXERCISES: CPT

## 2024-08-26 PROCEDURE — 97140 MANUAL THERAPY 1/> REGIONS: CPT

## 2024-08-26 ASSESSMENT — PAIN SCALES - GENERAL: PAINLEVEL_OUTOF10: 4

## 2024-08-26 NOTE — PROGRESS NOTES
Sona Wisdom  : 1980  Primary: [employer]  Secondary:  [facility] [place] @ [place] [address] [address] [place] [place] [address]  Phone: [phone]  Fax: [phone] Plan Frequency: 2x/week    Plan of Care/Certification Expiration Date: 09/15/24        Plan of Care/Certification Expiration Date:  Plan of Care/Certification Expiration Date: 09/15/24    Frequency/Duration:   Plan Frequency: 2x/week      Time In/Out:   Time In: 1000  Time Out: 1100      PT Visit Info:    Progress Note Counter: 4      Visit Count:  25    OUTPATIENT PHYSICAL THERAPY:   Treatment Note 2024       Episode  (right hip labral repair)               Treatment Diagnosis:    Pain in right hip  Stiffness of right hip, not elsewhere classified  Difficulty in walking, not elsewhere classified  Medical/Referring Diagnosis:    Femoral acetabular impingement [M25.859]    Referring Physician:  Steven Rogers MD MD Orders:  PT Eval and Treat   Return MD Appt:  next week    Date of Onset:  Onset Date: 24     Allergies:   Amoxicillin and Cefaclor  Restrictions/Precautions:   Post Surgical Precautions: labral repair       Interventions Planned (Treatment may consist of any combination of the following):     See Assessment Note    Subjective Comments:   Sona reports that she still can't notice any improvement since SIJ injection but that with recent PT, some of her ancillary pains are a little better (back pain, hip anterior pain)     Initial Pain Level::     4/10  Post Session Pain Level:       4/10  Medications Last Reviewed:  2024  Updated Objective Findings:  None Today      Treatment   Manual Therapy (43  Minutes): Manual techniques to facilitate improved motion and decreased pain. (Used abbreviations: MET - muscle energy technique; PNF - proprioceptive neuromuscular facilitation; NMR - neuromuscular re-education; a/p - anterior to posterior; p/a - posterior to anterior)

## 2024-08-28 NOTE — PROGRESS NOTES
Sona Wisdom  : 1980  Primary: [employer]  Secondary:  [facility] @ [place] [address] [address] [place] [address]  Phone: [phone]  Fax: [phone] Plan Frequency: 2x/week    Plan of Care/Certification Expiration Date: 09/15/24        Plan of Care/Certification Expiration Date:  Plan of Care/Certification Expiration Date: 09/15/24    Frequency/Duration:   Plan Frequency: 2x/week      Time In/Out:   Time In: 0900  Time Out: 1000      PT Visit Info:    Progress Note Counter: 5      Visit Count:  26    OUTPATIENT PHYSICAL THERAPY:   Treatment Note 2024       Episode  (right hip labral repair)               Treatment Diagnosis:    Pain in right hip  Stiffness of right hip, not elsewhere classified  Difficulty in walking, not elsewhere classified  Medical/Referring Diagnosis:    Femoral acetabular impingement [M25.859]    Referring Physician:  Steven Rogers MD MD Orders:  PT Eval and Treat   Return MD Appt:  next week    Date of Onset:  Onset Date: 24     Allergies:   Amoxicillin and Cefaclor  Restrictions/Precautions:   Post Surgical Precautions: labral repair       Interventions Planned (Treatment may consist of any combination of the following):     See Assessment Note    Subjective Comments:   Sona reports that she is still having a lot of pain.      Initial Pain Level::     4/10  Post Session Pain Level:       4/10  Medications Last Reviewed:  2024  Updated Objective Findings:  None Today      Treatment   Manual Therapy (40  Minutes): Manual techniques to facilitate improved motion and decreased pain. (Used abbreviations: MET - muscle energy technique; PNF - proprioceptive neuromuscular facilitation; NMR - neuromuscular re-education; a/p - anterior to posterior; p/a - posterior to anterior)   STM to quadriceps, hip flexors, TFL, adductors  STM to gluteals    Circumduction at neutral and 80 degrees (not performed)

## 2024-08-29 ENCOUNTER — HOSPITAL ENCOUNTER (OUTPATIENT)
Dept: PHYSICAL THERAPY | Age: 44
Setting detail: RECURRING SERIES
End: 2024-08-29
Attending: ORTHOPAEDIC SURGERY
Payer: COMMERCIAL

## 2024-08-29 PROCEDURE — 97110 THERAPEUTIC EXERCISES: CPT

## 2024-08-29 PROCEDURE — 97140 MANUAL THERAPY 1/> REGIONS: CPT

## 2024-08-29 ASSESSMENT — PAIN SCALES - GENERAL: PAINLEVEL_OUTOF10: 4

## 2024-09-03 ENCOUNTER — HOSPITAL ENCOUNTER (OUTPATIENT)
Dept: PHYSICAL THERAPY | Age: 44
Setting detail: RECURRING SERIES
End: 2024-09-03
Attending: ORTHOPAEDIC SURGERY
Payer: COMMERCIAL

## 2024-09-06 ENCOUNTER — HOSPITAL ENCOUNTER (OUTPATIENT)
Dept: PHYSICAL THERAPY | Age: 44
Setting detail: RECURRING SERIES
Discharge: HOME | End: 2024-09-09
Attending: ORTHOPAEDIC SURGERY
Payer: COMMERCIAL

## 2024-09-06 ENCOUNTER — PATIENT MESSAGE (OUTPATIENT)
Dept: ORTHOPEDIC SURGERY | Age: 44
End: 2024-09-06

## 2024-09-06 PROCEDURE — 97110 THERAPEUTIC EXERCISES: CPT

## 2024-09-06 PROCEDURE — 97140 MANUAL THERAPY 1/> REGIONS: CPT

## 2024-09-06 ASSESSMENT — PAIN SCALES - GENERAL: PAINLEVEL_OUTOF10: 5

## 2024-09-12 ENCOUNTER — OFFICE VISIT (OUTPATIENT)
Dept: ORTHOPEDIC SURGERY | Age: 44
End: 2024-09-12
Payer: COMMERCIAL

## 2024-09-12 DIAGNOSIS — M54.50 BILATERAL LOW BACK PAIN, UNSPECIFIED CHRONICITY, UNSPECIFIED WHETHER SCIATICA PRESENT: Primary | ICD-10-CM

## 2024-09-12 DIAGNOSIS — M25.551 RIGHT HIP PAIN: ICD-10-CM

## 2024-09-12 PROCEDURE — 99213 OFFICE O/P EST LOW 20 MIN: CPT | Performed by: ORTHOPAEDIC SURGERY

## 2024-09-24 DIAGNOSIS — M54.50 BILATERAL LOW BACK PAIN, UNSPECIFIED CHRONICITY, UNSPECIFIED WHETHER SCIATICA PRESENT: Primary | ICD-10-CM

## 2024-11-13 NOTE — PROGRESS NOTES
Type Anes PTL Lv   4             3             2             1                Obstetric Comments   NVD       Health Maintenance  Mammogram: Never Advised and order given  Colonoscopy: Years ago  Bone Density:  Pap smear: 10-      Review of Systems  General: Not Present- Fatigue, Insomnia, Hot flashes/Night sweats, Weight gain, Brain fog  Breast: Not Present- Breast Mass, Breast Pain, Breast Swelling, Nipple Discharge, Nipple Pain, Recent Breast Size Changes and Skin Changes.  Gastrointestinal: Not Present- Abdominal Pain,  Bloating, Constipation, Diarrhea, Nausea, Rectal bleeding  Female Genitourinary: Not Present- Dysmenorrhea, Dyspareunia, Decreased libido, Excessive Menstrual Bleeding, Menstrual Irregularities, Pelvic Pain, Urinary Complaints, Vaginal Discharge, Vaginal itching/burning, Vaginal odor, Vaginal dryness  + back pain  Psychiatric: Not Present- Anxiety, Depression, Mood changes and Panic Attacks.         PHYSICAL EXAM:     /76   Ht 1.6 m (5' 3\")   Wt 54.4 kg (120 lb)   LMP 2024   BMI 21.26 kg/m²       General   Mental Status - Well groomed; well nourished.  Oriented x 3.  Appropriate mood and affect    Integumentary   No rashes;  no suspicious lesions.     Head and Neck  Head - no lesions or palpable masses.   Neck -  normal   Thyroid - normal size and consistency;  no palpable nodules.      Chest and Lung Exam   Chest and lung exam - normal breath sounds    Cardiovascular   Cardiovascular examination  - normal heart sounds, regular rate and rhythm with no murmurs.     Breast  Left - Normal.  Right - Normal.     Abdomen   Inspection: - Normal.   Palpation/Percussion -  Normal  Auscultation:  - Bowel sounds normal.     Female Genitourinary     External Genitalia   Vulva: Normal.   Perineum: Normal.   Bartholin's Gland:  Normal.   Clitoris :  Normal.   Introitus:  Normal.   Urethra:  Normal.     Speculum & Bimanual   Vagina: -  Normal.   Vaginal Lesions

## 2024-11-14 ENCOUNTER — OFFICE VISIT (OUTPATIENT)
Dept: OBGYN CLINIC | Age: 44
End: 2024-11-14
Payer: COMMERCIAL

## 2024-11-14 VITALS
BODY MASS INDEX: 21.26 KG/M2 | WEIGHT: 120 LBS | DIASTOLIC BLOOD PRESSURE: 76 MMHG | HEIGHT: 63 IN | SYSTOLIC BLOOD PRESSURE: 108 MMHG

## 2024-11-14 DIAGNOSIS — Z12.31 VISIT FOR SCREENING MAMMOGRAM: ICD-10-CM

## 2024-11-14 DIAGNOSIS — M54.50 CHRONIC BILATERAL LOW BACK PAIN WITHOUT SCIATICA: ICD-10-CM

## 2024-11-14 DIAGNOSIS — G89.29 CHRONIC BILATERAL LOW BACK PAIN WITHOUT SCIATICA: ICD-10-CM

## 2024-11-14 DIAGNOSIS — Z01.419 WELL WOMAN EXAM: Primary | ICD-10-CM

## 2024-11-14 DIAGNOSIS — Z12.4 SCREENING FOR MALIGNANT NEOPLASM OF CERVIX: ICD-10-CM

## 2024-11-14 PROCEDURE — 99386 PREV VISIT NEW AGE 40-64: CPT | Performed by: OBSTETRICS & GYNECOLOGY

## 2024-11-14 PROCEDURE — 99459 PELVIC EXAMINATION: CPT | Performed by: OBSTETRICS & GYNECOLOGY

## 2024-11-14 RX ORDER — MECOBALAMIN 5000 MCG
15 TABLET,DISINTEGRATING ORAL DAILY PRN
COMMUNITY

## 2024-11-21 LAB
COLLECTION METHOD: NORMAL
CYTOLOGIST CVX/VAG CYTO: NORMAL
DATE OF LMP: NORMAL
HPV REFLEX: NORMAL
Lab: NORMAL
Lab: NORMAL
PAP SOURCE: NORMAL
PATH REPORT.FINAL DX SPEC: NORMAL
STAT OF ADQ CVX/VAG CYTO-IMP: NORMAL

## 2024-12-06 ENCOUNTER — HOSPITAL ENCOUNTER (OUTPATIENT)
Dept: MAMMOGRAPHY | Age: 44
Discharge: HOME | End: 2024-12-09
Attending: OBSTETRICS & GYNECOLOGY
Payer: COMMERCIAL

## 2024-12-06 DIAGNOSIS — Z12.31 VISIT FOR SCREENING MAMMOGRAM: ICD-10-CM

## 2024-12-06 PROCEDURE — 77063 BREAST TOMOSYNTHESIS BI: CPT

## 2024-12-30 ENCOUNTER — HOSPITAL ENCOUNTER (OUTPATIENT)
Dept: GENERAL RADIOLOGY | Age: 44
Discharge: HOME | End: 2025-01-02
Payer: COMMERCIAL

## 2024-12-30 DIAGNOSIS — M54.59 OTHER LOW BACK PAIN: ICD-10-CM

## 2024-12-30 PROCEDURE — 72202 X-RAY EXAM SI JOINTS 3/> VWS: CPT

## 2025-02-14 NOTE — PROGRESS NOTES
Assessment complete per Doc Flowsheet. WNL. It was a pleasure seeing Augusto!    We tested her for flu, COVID, and RSV. You will be able to see these results in Mychart and we will call if positive.    Please go to the ER if Augusto is having signs that she is struggling to breathe.    Thank you for letting us take part in her care!

## (undated) DEVICE — OUTFLOW CASSETTE TUBING, DO NOT USE IF PACKAGE IS DAMAGED: Brand: CROSSFLOW

## (undated) DEVICE — SLIM BODY SKIN STAPLER: Brand: APPOSE ULC

## (undated) DEVICE — SUTURE VCRL SZ 2 L54IN ABSRB UD L65MM TP-1 1/2 CIR J880T

## (undated) DEVICE — 4-PORT MANIFOLD: Brand: NEPTUNE 2

## (undated) DEVICE — SOLUTION IRRIG 3000ML 0.9% SOD CHL FLX CONT 0797208] ICU MEDICAL INC]

## (undated) DEVICE — XL AGGRESSIVE PLUS, HIP CUTTER. ARTHROSCOPIC SHAVER BLADE. FOR USE WITH: REF 0375-701-500, 0375-704-500, 0375-708-500. DO NOT USE IF PACKAGE IS DAMAGED, KEEP DRY, KEEP AWAY FROM SUNLIGHT: Brand: FORMULA

## (undated) DEVICE — SOL ANTI-FOG 6ML MEDC -- MEDICHOICE - CONVERT TO 358427

## (undated) DEVICE — AMBIENT HIPVAC 50 IFS: Brand: AMBIENT

## (undated) DEVICE — (D)PREP SKN CHLRAPRP APPL 26ML -- CONVERT TO ITEM 371833

## (undated) DEVICE — INFLOW CASSETTE TUBING, DO NOT USE IF PACKAGE IS DAMAGED: Brand: CROSSFLOW

## (undated) DEVICE — SLINGSHOT 70 UP: Brand: SLINGSHOT

## (undated) DEVICE — SAMURAI BLADE FULL RADIUS: Brand: SAMURAI

## (undated) DEVICE — DRAPE TWL SURG 16X26IN BLU ORB04] ALLCARE INC]

## (undated) DEVICE — DISPOSABLE MULTI BAG ADAPTERS Y                                    TUBING, STERILE, 2 TO A SET 6 SETS                                    PER BOX

## (undated) DEVICE — BOOT INSERT PAD

## (undated) DEVICE — SOFT SILICONE HYDROCELLULAR FOAM DRESSING WITH LOCK AWAY LAYER: Brand: ALLEVYN LIFE XL 21X21 CTN10

## (undated) DEVICE — SURGICAL PROCEDURE PACK BASIC ST FRANCIS

## (undated) DEVICE — DRAPE,TOP,102X53,STERILE: Brand: MEDLINE

## (undated) DEVICE — DRAPE SHT 3 QTR PROXIMA 53X77 --

## (undated) DEVICE — NANOPASS REACH CRESCENT: Brand: NANOPASS

## (undated) DEVICE — TRAY CATH 16F DRN BG LTX -- CONVERT TO ITEM 363158

## (undated) DEVICE — GUARDIAN LVC: Brand: GUARDIAN

## (undated) DEVICE — INTENDED FOR TISSUE SEPARATION, AND OTHER PROCEDURES THAT REQUIRE A SHARP SURGICAL BLADE TO PUNCTURE OR CUT.: Brand: BARD-PARKER SAFETY BLADES SIZE 11, STERILE

## (undated) DEVICE — BANDAGE COMPR SELF ADH 5 YDX4 IN TAN STRL PREMIERPRO LF

## (undated) DEVICE — COVER,MAYO STAND,STERILE: Brand: MEDLINE

## (undated) DEVICE — FLOWPORT II CANNULA WITH OBTURATOR STRYKER 165MM: Brand: FLOWPORT

## (undated) DEVICE — TRANSPORT CANNULA 8MM LENGTH 7, 8, 9: Brand: TRANSPORT

## (undated) DEVICE — DRAPE XR C ARM 41X74IN LF --

## (undated) DEVICE — (D)DRAPE ISOL ANTIMC 129X100IN -- DISC BY MFR

## (undated) DEVICE — MEDI-VAC NON-CONDUCTIVE SUCTION TUBING: Brand: CARDINAL HEALTH

## (undated) DEVICE — PORTAL ENTRY KIT

## (undated) DEVICE — NANOTACK FLEX DRILL BIT: Brand: NANOTACK FLEX

## (undated) DEVICE — PERINEAL POST PAD 1

## (undated) DEVICE — SUT ETHLN 3-0 18IN PS2 BLK --